# Patient Record
Sex: MALE | Race: WHITE | NOT HISPANIC OR LATINO | Employment: FULL TIME | URBAN - METROPOLITAN AREA
[De-identification: names, ages, dates, MRNs, and addresses within clinical notes are randomized per-mention and may not be internally consistent; named-entity substitution may affect disease eponyms.]

---

## 2022-10-22 ENCOUNTER — APPOINTMENT (OUTPATIENT)
Dept: CT IMAGING | Facility: HOSPITAL | Age: 62
End: 2022-10-22

## 2022-10-22 ENCOUNTER — HOSPITAL ENCOUNTER (EMERGENCY)
Facility: HOSPITAL | Age: 62
Discharge: ANOTHER HOSPITAL | End: 2022-10-22
Attending: EMERGENCY MEDICINE | Admitting: EMERGENCY MEDICINE

## 2022-10-22 VITALS
TEMPERATURE: 98.7 F | HEIGHT: 70 IN | DIASTOLIC BLOOD PRESSURE: 77 MMHG | HEART RATE: 67 BPM | BODY MASS INDEX: 27.92 KG/M2 | RESPIRATION RATE: 15 BRPM | WEIGHT: 195 LBS | SYSTOLIC BLOOD PRESSURE: 116 MMHG | OXYGEN SATURATION: 97 %

## 2022-10-22 DIAGNOSIS — S02.92XA CLOSED EXTENSIVE FACIAL FRACTURES, INITIAL ENCOUNTER: Primary | ICD-10-CM

## 2022-10-22 DIAGNOSIS — S16.1XXA STRAIN OF NECK MUSCLE, INITIAL ENCOUNTER: ICD-10-CM

## 2022-10-22 DIAGNOSIS — S09.90XA INJURY OF HEAD, INITIAL ENCOUNTER: ICD-10-CM

## 2022-10-22 PROCEDURE — 99284 EMERGENCY DEPT VISIT MOD MDM: CPT | Performed by: EMERGENCY MEDICINE

## 2022-10-22 PROCEDURE — 70486 CT MAXILLOFACIAL W/O DYE: CPT

## 2022-10-22 PROCEDURE — 99283 EMERGENCY DEPT VISIT LOW MDM: CPT

## 2022-10-22 PROCEDURE — 70450 CT HEAD/BRAIN W/O DYE: CPT

## 2022-10-22 PROCEDURE — 72125 CT NECK SPINE W/O DYE: CPT

## 2022-10-22 RX ORDER — ATORVASTATIN CALCIUM 80 MG/1
80 TABLET, FILM COATED ORAL DAILY
COMMUNITY

## 2022-10-22 RX ORDER — METOPROLOL TARTRATE 50 MG/1
50 TABLET, FILM COATED ORAL 2 TIMES DAILY
COMMUNITY

## 2022-10-22 RX ORDER — ONDANSETRON 4 MG/1
4 TABLET, ORALLY DISINTEGRATING ORAL ONCE
Status: DISCONTINUED | OUTPATIENT
Start: 2022-10-22 | End: 2022-10-22 | Stop reason: HOSPADM

## 2022-10-22 RX ORDER — DIAPER,BRIEF,INFANT-TODD,DISP
1 EACH MISCELLANEOUS ONCE
Status: DISCONTINUED | OUTPATIENT
Start: 2022-10-22 | End: 2022-10-22 | Stop reason: HOSPADM

## 2022-10-22 RX ORDER — LOSARTAN POTASSIUM AND HYDROCHLOROTHIAZIDE 12.5; 5 MG/1; MG/1
TABLET ORAL DAILY
COMMUNITY

## 2022-10-22 RX ORDER — OMEPRAZOLE 40 MG/1
40 CAPSULE, DELAYED RELEASE ORAL DAILY
COMMUNITY

## 2022-10-22 NOTE — PROGRESS NOTES
"SBAR:    S: Pt to ED/UC c/o mechanical fall tripping over shoes hitting right eye on fire place from standing position around 2330 last night. Unknown LOC. C/o dizziness and HA    B: PMH HTN, HCL. Denies AC. Drinks 4-6 beers 5 nights a week.     A: Pt alert and oriented x4. Speech is clear. Speaking in complete sentences. Large hematoma to right periorbital. Swelling impairing pt ability to open right eye completely. C/o 2/10 HA. C/o \"nasal congestion\". CT head shows multiple maxillofacial fractures. See CT read for entire report. +nausea, no active vomiting. Offered nausea meds, refused. No IV.     R: Transfer to [facility] via private vehicle. Verbal report given to TRACY Antonio. Accepting MD Dockery  "

## 2022-10-22 NOTE — ED PROVIDER NOTES
Subjective   History of Present Illness  Patient 62-year-old man who had fell asleep on the couch last night and got up in the middle the night and tripped and fell onto his head and face.  He has bruising to the left orbit and abrasion to the nasal bridge.  Patient does not recall the events and feels he did pass out.  He denies any vomiting or numbness or weakness.  His neck is sore this morning.  He denies any chest pain or shortness of breath or abdominal pain or upper or lower extremity injuries.  He has not on any blood thinners.  He believes his last tetanus immunization was within the past 5 years.        Review of Systems   Constitutional: Negative for fever.   HENT: Positive for nosebleeds. Negative for sore throat.    Respiratory: Negative for cough and shortness of breath.    Gastrointestinal: Negative for nausea and vomiting.   Genitourinary: Negative for difficulty urinating.   Musculoskeletal: Positive for neck pain and neck stiffness.   Skin: Positive for wound.   Neurological: Negative for weakness and numbness.   All other systems reviewed and are negative.      Past Medical History:   Diagnosis Date   • Hyperlipidemia    • Hypertension        No Known Allergies    History reviewed. No pertinent surgical history.    History reviewed. No pertinent family history.    Social History     Socioeconomic History   • Marital status: Single   Tobacco Use   • Smoking status: Never   • Smokeless tobacco: Current     Types: Chew   Substance and Sexual Activity   • Alcohol use: Yes     Alcohol/week: 4.0 standard drinks     Types: 4 Cans of beer per week     Comment: 5/7 days/wk   • Drug use: Never           Objective   Physical Exam  Vitals and nursing note reviewed.   Constitutional:       Appearance: Normal appearance.   HENT:      Head: Normocephalic.      Comments: Tenderness and ecchymosis to the superior orbit on the left side.  There is swelling to the eyelid.  There is no inferior orbital tenderness.   Patient has no evidence of entrapment.     Nose:      Comments: Diffuse nasal tenderness.  No gross deformity.  There is abrasion to the nasal bridge.  No septal hematoma noted.     Mouth/Throat:      Mouth: Mucous membranes are moist.      Pharynx: Oropharynx is clear.   Neck:      Comments: Paraspinal tenderness to the cervical spine without midline tenderness or step-off.  Cardiovascular:      Rate and Rhythm: Normal rate and regular rhythm.      Pulses: Normal pulses.      Heart sounds: Normal heart sounds.   Pulmonary:      Effort: Pulmonary effort is normal.      Breath sounds: Normal breath sounds.   Chest:      Chest wall: No tenderness.   Abdominal:      Palpations: Abdomen is soft.      Tenderness: There is no abdominal tenderness.   Musculoskeletal:         General: No tenderness. Normal range of motion.   Skin:     General: Skin is warm and dry.      Capillary Refill: Capillary refill takes less than 2 seconds.   Neurological:      General: No focal deficit present.      Mental Status: He is alert.      Sensory: No sensory deficit.      Motor: No weakness.         Procedures           ED Course           1858: Patient advised of plan.  Patient to go to [facility] emergency department for further evaluation of facial injuries.  Patient reevaluated.  Patient does have upward gaze of the left eye.  When using both eyes he does not complain of double vision.  Due to the swelling of the upper eyelid he does get limited eye vision when looking upwards.  Nasal wound was cleaned.  Antibiotic ointment to be applied.                                MDM   Patient with trip and fall last night when he fell asleep on the couch got up and tripped and fell striking his left side of the face and head.  He believes he had loss of consciousness.  He has ecchymosis of the left orbit.  No evidence of entrapment and no visual changes.  He does have abrasion to the nasal bridge.  Patient underwent CT of the head  and facial bones and neck.  He does have facial fractures.  A call was placed to ENT to discuss follow-up plans.    1823: Discussed with ENT, Dr. MARION Torres, states he does not treat facial fractures.  Will contact trauma center.    1848: d/w Dr. Seth Staples OMF.   Requests patient be sent to [facility] ED   Appreciate assistance with patients care    Discussed with ED physician Dr. Dubois, requests patient to be sent to the [facility] emergency department.  Patient has been accepted.  Appreciate assistance by consulting providers.      Final diagnoses:   Closed extensive facial fractures, initial encounter (HCC)   Injury of head, initial encounter   Strain of neck muscle, initial encounter       ED Disposition  ED Disposition     ED Disposition   Transfer to Another Facility     Condition   --    Comment   Patient to be transferred to [facility] emergency department for OMF evaluation.             No follow-up provider specified.       Medication List      No changes were made to your prescriptions during this visit.          Vinh Norman MD  10/22/22 1920

## 2022-10-22 NOTE — ED NOTES
Pt update on plan of care regarding awaiting call back from ENT. Pt stated understanding. VS updated. No apparent distress noted. Call light within reach

## 2025-04-19 ENCOUNTER — APPOINTMENT (OUTPATIENT)
Dept: CT IMAGING | Facility: HOSPITAL | Age: 65
DRG: 683 | End: 2025-04-19
Payer: MEDICARE

## 2025-04-19 ENCOUNTER — HOSPITAL ENCOUNTER (INPATIENT)
Facility: HOSPITAL | Age: 65
LOS: 2 days | Discharge: HOME | DRG: 683 | End: 2025-04-21
Attending: EMERGENCY MEDICINE | Admitting: INTERNAL MEDICINE
Payer: MEDICARE

## 2025-04-19 ENCOUNTER — APPOINTMENT (OUTPATIENT)
Dept: GENERAL RADIOLOGY | Facility: HOSPITAL | Age: 65
DRG: 683 | End: 2025-04-19
Payer: MEDICARE

## 2025-04-19 DIAGNOSIS — R00.0 TACHYCARDIA, UNSPECIFIED: ICD-10-CM

## 2025-04-19 DIAGNOSIS — R61 DIAPHORESIS: ICD-10-CM

## 2025-04-19 DIAGNOSIS — R53.1 GENERALIZED WEAKNESS: Primary | ICD-10-CM

## 2025-04-19 DIAGNOSIS — R00.0 SINUS TACHYCARDIA: ICD-10-CM

## 2025-04-19 DIAGNOSIS — R79.89 ELEVATED LACTIC ACID LEVEL: ICD-10-CM

## 2025-04-19 PROBLEM — I10 HTN (HYPERTENSION): Status: ACTIVE | Noted: 2025-04-19

## 2025-04-19 PROBLEM — F41.9 ANXIETY: Status: ACTIVE | Noted: 2025-04-19

## 2025-04-19 PROBLEM — E78.5 DYSLIPIDEMIA: Status: ACTIVE | Noted: 2025-04-19

## 2025-04-19 LAB
ALBUMIN SERPL-MCNC: 4.7 G/DL (ref 3.5–5.2)
ALBUMIN/GLOB SERPL: 1.7 G/DL
ALP SERPL-CCNC: 85 U/L (ref 39–117)
ALT SERPL W P-5'-P-CCNC: 28 U/L (ref 1–41)
ANION GAP SERPL CALCULATED.3IONS-SCNC: 16.5 MMOL/L (ref 5–15)
APTT PPP: 23.3 SECONDS (ref 22.7–35.4)
AST SERPL-CCNC: 21 U/L (ref 1–40)
BASOPHILS # BLD AUTO: 0.04 10*3/MM3 (ref 0–0.2)
BASOPHILS NFR BLD AUTO: 0.4 % (ref 0–1.5)
BILIRUB SERPL-MCNC: 0.7 MG/DL (ref 0–1.2)
BILIRUB UR QL STRIP: NEGATIVE
BUN SERPL-MCNC: 15 MG/DL (ref 8–23)
BUN/CREAT SERPL: 10.6 (ref 7–25)
CALCIUM SPEC-SCNC: 10.1 MG/DL (ref 8.6–10.5)
CHLORIDE SERPL-SCNC: 98 MMOL/L (ref 98–107)
CLARITY UR: CLEAR
CO2 SERPL-SCNC: 24.5 MMOL/L (ref 22–29)
COLOR UR: YELLOW
CREAT SERPL-MCNC: 1.42 MG/DL (ref 0.76–1.27)
CRP SERPL-MCNC: <0.3 MG/DL (ref 0–0.5)
D-LACTATE SERPL-SCNC: 1.3 MMOL/L (ref 0.5–2)
D-LACTATE SERPL-SCNC: 2.5 MMOL/L (ref 0.5–2)
DEPRECATED RDW RBC AUTO: 44.7 FL (ref 37–54)
EGFRCR SERPLBLD CKD-EPI 2021: 54.8 ML/MIN/1.73
EOSINOPHIL # BLD AUTO: 0.07 10*3/MM3 (ref 0–0.4)
EOSINOPHIL NFR BLD AUTO: 0.8 % (ref 0.3–6.2)
ERYTHROCYTE [DISTWIDTH] IN BLOOD BY AUTOMATED COUNT: 13 % (ref 12.3–15.4)
ERYTHROCYTE [SEDIMENTATION RATE] IN BLOOD: 11 MM/HR (ref 0–20)
GEN 5 1HR TROPONIN T REFLEX: 16 NG/L
GLOBULIN UR ELPH-MCNC: 2.8 GM/DL
GLUCOSE SERPL-MCNC: 140 MG/DL (ref 65–99)
GLUCOSE UR STRIP-MCNC: NEGATIVE MG/DL
HCT VFR BLD AUTO: 45.6 % (ref 37.5–51)
HGB BLD-MCNC: 16 G/DL (ref 13–17.7)
HGB UR QL STRIP.AUTO: NEGATIVE
IMM GRANULOCYTES # BLD AUTO: 0.02 10*3/MM3 (ref 0–0.05)
IMM GRANULOCYTES NFR BLD AUTO: 0.2 % (ref 0–0.5)
INR PPP: 1.04 (ref 0.9–1.1)
KETONES UR QL STRIP: NEGATIVE
LEUKOCYTE ESTERASE UR QL STRIP.AUTO: NEGATIVE
LYMPHOCYTES # BLD AUTO: 1.8 10*3/MM3 (ref 0.7–3.1)
LYMPHOCYTES NFR BLD AUTO: 19.9 % (ref 19.6–45.3)
MCH RBC QN AUTO: 33.1 PG (ref 26.6–33)
MCHC RBC AUTO-ENTMCNC: 35.1 G/DL (ref 31.5–35.7)
MCV RBC AUTO: 94.2 FL (ref 79–97)
MONOCYTES # BLD AUTO: 0.79 10*3/MM3 (ref 0.1–0.9)
MONOCYTES NFR BLD AUTO: 8.7 % (ref 5–12)
NEUTROPHILS NFR BLD AUTO: 6.31 10*3/MM3 (ref 1.7–7)
NEUTROPHILS NFR BLD AUTO: 70 % (ref 42.7–76)
NITRITE UR QL STRIP: NEGATIVE
NRBC BLD AUTO-RTO: 0 /100 WBC (ref 0–0.2)
PH UR STRIP.AUTO: 7 [PH] (ref 5–8)
PLATELET # BLD AUTO: 187 10*3/MM3 (ref 140–450)
PMV BLD AUTO: 10.3 FL (ref 6–12)
POTASSIUM SERPL-SCNC: 3.1 MMOL/L (ref 3.5–5.2)
PROCALCITONIN SERPL-MCNC: 0.08 NG/ML (ref 0–0.25)
PROT SERPL-MCNC: 7.5 G/DL (ref 6–8.5)
PROT UR QL STRIP: NEGATIVE
PROTHROMBIN TIME: 13.6 SECONDS (ref 11.7–14.2)
RBC # BLD AUTO: 4.84 10*6/MM3 (ref 4.14–5.8)
SODIUM SERPL-SCNC: 139 MMOL/L (ref 136–145)
SP GR UR STRIP: 1.01 (ref 1–1.03)
TROPONIN T NUMERIC DELTA: 1 NG/L
TROPONIN T SERPL HS-MCNC: 15 NG/L
UROBILINOGEN UR QL STRIP: NORMAL
WBC NRBC COR # BLD AUTO: 9.03 10*3/MM3 (ref 3.4–10.8)

## 2025-04-19 PROCEDURE — 84484 ASSAY OF TROPONIN QUANT: CPT | Performed by: EMERGENCY MEDICINE

## 2025-04-19 PROCEDURE — 93005 ELECTROCARDIOGRAM TRACING: CPT | Performed by: EMERGENCY MEDICINE

## 2025-04-19 PROCEDURE — 36415 COLL VENOUS BLD VENIPUNCTURE: CPT

## 2025-04-19 PROCEDURE — 85730 THROMBOPLASTIN TIME PARTIAL: CPT | Performed by: EMERGENCY MEDICINE

## 2025-04-19 PROCEDURE — 85652 RBC SED RATE AUTOMATED: CPT | Performed by: INTERNAL MEDICINE

## 2025-04-19 PROCEDURE — 80053 COMPREHEN METABOLIC PANEL: CPT | Performed by: EMERGENCY MEDICINE

## 2025-04-19 PROCEDURE — 84145 PROCALCITONIN (PCT): CPT | Performed by: EMERGENCY MEDICINE

## 2025-04-19 PROCEDURE — 85610 PROTHROMBIN TIME: CPT | Performed by: EMERGENCY MEDICINE

## 2025-04-19 PROCEDURE — 85025 COMPLETE CBC W/AUTO DIFF WBC: CPT | Performed by: EMERGENCY MEDICINE

## 2025-04-19 PROCEDURE — 70450 CT HEAD/BRAIN W/O DYE: CPT

## 2025-04-19 PROCEDURE — 25810000003 SODIUM CHLORIDE 0.9 % SOLUTION: Performed by: INTERNAL MEDICINE

## 2025-04-19 PROCEDURE — 99285 EMERGENCY DEPT VISIT HI MDM: CPT

## 2025-04-19 PROCEDURE — 83605 ASSAY OF LACTIC ACID: CPT | Performed by: EMERGENCY MEDICINE

## 2025-04-19 PROCEDURE — 25810000003 SODIUM CHLORIDE 0.9 % SOLUTION: Performed by: EMERGENCY MEDICINE

## 2025-04-19 PROCEDURE — 93005 ELECTROCARDIOGRAM TRACING: CPT

## 2025-04-19 PROCEDURE — 93010 ELECTROCARDIOGRAM REPORT: CPT | Performed by: INTERNAL MEDICINE

## 2025-04-19 PROCEDURE — 81003 URINALYSIS AUTO W/O SCOPE: CPT | Performed by: EMERGENCY MEDICINE

## 2025-04-19 PROCEDURE — 71045 X-RAY EXAM CHEST 1 VIEW: CPT

## 2025-04-19 PROCEDURE — 86140 C-REACTIVE PROTEIN: CPT | Performed by: EMERGENCY MEDICINE

## 2025-04-19 RX ORDER — PANTOPRAZOLE SODIUM 40 MG/1
40 TABLET, DELAYED RELEASE ORAL
Status: DISCONTINUED | OUTPATIENT
Start: 2025-04-20 | End: 2025-04-21 | Stop reason: HOSPADM

## 2025-04-19 RX ORDER — ONDANSETRON 2 MG/ML
4 INJECTION INTRAMUSCULAR; INTRAVENOUS EVERY 6 HOURS PRN
Status: DISCONTINUED | OUTPATIENT
Start: 2025-04-19 | End: 2025-04-21 | Stop reason: HOSPADM

## 2025-04-19 RX ORDER — SODIUM CHLORIDE 9 MG/ML
40 INJECTION, SOLUTION INTRAVENOUS AS NEEDED
Status: DISCONTINUED | OUTPATIENT
Start: 2025-04-19 | End: 2025-04-21 | Stop reason: HOSPADM

## 2025-04-19 RX ORDER — METOPROLOL TARTRATE 50 MG
50 TABLET ORAL 2 TIMES DAILY
Status: DISCONTINUED | OUTPATIENT
Start: 2025-04-19 | End: 2025-04-21 | Stop reason: HOSPADM

## 2025-04-19 RX ORDER — LOSARTAN POTASSIUM 50 MG/1
50 TABLET ORAL
Status: DISCONTINUED | OUTPATIENT
Start: 2025-04-20 | End: 2025-04-21 | Stop reason: HOSPADM

## 2025-04-19 RX ORDER — SODIUM CHLORIDE 0.9 % (FLUSH) 0.9 %
10 SYRINGE (ML) INJECTION EVERY 12 HOURS SCHEDULED
Status: DISCONTINUED | OUTPATIENT
Start: 2025-04-19 | End: 2025-04-21 | Stop reason: HOSPADM

## 2025-04-19 RX ORDER — ATORVASTATIN CALCIUM 80 MG/1
80 TABLET, FILM COATED ORAL DAILY
Status: DISCONTINUED | OUTPATIENT
Start: 2025-04-20 | End: 2025-04-21 | Stop reason: HOSPADM

## 2025-04-19 RX ORDER — SODIUM CHLORIDE 0.9 % (FLUSH) 0.9 %
10 SYRINGE (ML) INJECTION AS NEEDED
Status: DISCONTINUED | OUTPATIENT
Start: 2025-04-19 | End: 2025-04-21 | Stop reason: HOSPADM

## 2025-04-19 RX ORDER — ONDANSETRON 4 MG/1
4 TABLET, ORALLY DISINTEGRATING ORAL EVERY 6 HOURS PRN
Status: DISCONTINUED | OUTPATIENT
Start: 2025-04-19 | End: 2025-04-21 | Stop reason: HOSPADM

## 2025-04-19 RX ORDER — POTASSIUM CHLORIDE 1500 MG/1
40 TABLET, EXTENDED RELEASE ORAL EVERY 4 HOURS
Status: COMPLETED | OUTPATIENT
Start: 2025-04-19 | End: 2025-04-20

## 2025-04-19 RX ORDER — SODIUM CHLORIDE 9 MG/ML
125 INJECTION, SOLUTION INTRAVENOUS CONTINUOUS
Status: ACTIVE | OUTPATIENT
Start: 2025-04-19 | End: 2025-04-20

## 2025-04-19 RX ORDER — NITROGLYCERIN 0.4 MG/1
0.4 TABLET SUBLINGUAL
Status: DISCONTINUED | OUTPATIENT
Start: 2025-04-19 | End: 2025-04-21 | Stop reason: HOSPADM

## 2025-04-19 RX ORDER — ACETAMINOPHEN 650 MG/1
650 SUPPOSITORY RECTAL EVERY 4 HOURS PRN
Status: DISCONTINUED | OUTPATIENT
Start: 2025-04-19 | End: 2025-04-21 | Stop reason: HOSPADM

## 2025-04-19 RX ORDER — ACETAMINOPHEN 160 MG/5ML
650 SOLUTION ORAL EVERY 4 HOURS PRN
Status: DISCONTINUED | OUTPATIENT
Start: 2025-04-19 | End: 2025-04-21 | Stop reason: HOSPADM

## 2025-04-19 RX ORDER — HYDROCHLOROTHIAZIDE 12.5 MG/1
12.5 TABLET ORAL
Status: DISCONTINUED | OUTPATIENT
Start: 2025-04-20 | End: 2025-04-20

## 2025-04-19 RX ORDER — ACETAMINOPHEN 325 MG/1
650 TABLET ORAL EVERY 4 HOURS PRN
Status: DISCONTINUED | OUTPATIENT
Start: 2025-04-19 | End: 2025-04-21 | Stop reason: HOSPADM

## 2025-04-19 RX ADMIN — SODIUM CHLORIDE 125 ML/HR: 9 INJECTION, SOLUTION INTRAVENOUS at 22:33

## 2025-04-19 RX ADMIN — POTASSIUM CHLORIDE 40 MEQ: 1500 TABLET, EXTENDED RELEASE ORAL at 20:42

## 2025-04-19 RX ADMIN — Medication 10 ML: at 22:38

## 2025-04-19 RX ADMIN — METOPROLOL TARTRATE 50 MG: 50 TABLET, FILM COATED ORAL at 22:36

## 2025-04-19 RX ADMIN — SODIUM CHLORIDE 1000 ML: 9 INJECTION, SOLUTION INTRAVENOUS at 16:39

## 2025-04-19 NOTE — ED PROVIDER NOTES
EMERGENCY DEPARTMENT ENCOUNTER    Room Number:  25/25  PCP: Antonietta Romero APRN  Historian: Patient/family      HPI:  Chief Complaint: Weakness  A complete HPI/ROS/PMH/PSH/SH/FH are unobtainable due to: None  Context: Bud Camacho is a 64 y.o. male who presents to the ED c/o sudden onset of episodic generalized weakness that is now been present over the last 1 to 2 weeks.  He reports the episodes can occur without warning and are typically associated with nausea, diaphoresis, as well as lightheadedness.  He also reports some tremors in his upper extremities bilaterally occasionally.  He currently denies headache, chest pain, back pain, fever/chills, dysuria/hematuria, or known sick contacts.  He denies previous episodes of similar symptoms and states that the symptoms are debilitating and quite severe in intensity.            PAST MEDICAL HISTORY  Active Ambulatory Problems     Diagnosis Date Noted    No Active Ambulatory Problems     Resolved Ambulatory Problems     Diagnosis Date Noted    No Resolved Ambulatory Problems     Past Medical History:   Diagnosis Date    Hyperlipidemia     Hypertension          PAST SURGICAL HISTORY  History reviewed. No pertinent surgical history.      FAMILY HISTORY  History reviewed. No pertinent family history.      SOCIAL HISTORY  Social History     Socioeconomic History    Marital status:    Tobacco Use    Smoking status: Never    Smokeless tobacco: Current     Types: Chew   Substance and Sexual Activity    Alcohol use: Yes     Alcohol/week: 4.0 standard drinks of alcohol     Types: 4 Cans of beer per week     Comment: 5/7 days/wk    Drug use: Never         ALLERGIES  Amlodipine, Lisinopril, and Olmesartan        REVIEW OF SYSTEMS  Review of Systems   Constitutional:  Positive for diaphoresis. Negative for activity change, appetite change and fever.   HENT:  Negative for congestion and sore throat.    Eyes: Negative.    Respiratory:  Negative for cough and shortness  of breath.    Cardiovascular:  Negative for chest pain and leg swelling.   Gastrointestinal:  Positive for nausea. Negative for abdominal pain, diarrhea and vomiting.   Endocrine: Negative.    Genitourinary:  Negative for decreased urine volume and dysuria.   Musculoskeletal:  Negative for neck pain.   Skin:  Negative for rash and wound.   Allergic/Immunologic: Negative.    Neurological:  Positive for tremors, weakness and light-headedness. Negative for numbness and headaches.   Hematological: Negative.    Psychiatric/Behavioral: Negative.     All other systems reviewed and are negative.         PHYSICAL EXAM  ED Triage Vitals   Temp Heart Rate Resp BP SpO2   04/19/25 1525 04/19/25 1525 04/19/25 1525 04/19/25 1526 04/19/25 1525   98.8 °F (37.1 °C) (!) 147 18 140/93 97 %      Temp src Heart Rate Source Patient Position BP Location FiO2 (%)   -- 04/19/25 1525 04/19/25 1526 04/19/25 1526 --    Monitor Sitting Right arm        Physical Exam  Constitutional:       General: He is not in acute distress.     Appearance: Normal appearance. He is not ill-appearing or toxic-appearing.   HENT:      Head: Normocephalic and atraumatic.   Eyes:      Extraocular Movements: Extraocular movements intact.      Pupils: Pupils are equal, round, and reactive to light.   Cardiovascular:      Rate and Rhythm: Regular rhythm. Tachycardia present.      Heart sounds: No murmur heard.     No friction rub. No gallop.   Pulmonary:      Effort: Pulmonary effort is normal.      Breath sounds: Normal breath sounds.   Abdominal:      General: Abdomen is flat. There is no distension.      Palpations: Abdomen is soft.      Tenderness: There is no abdominal tenderness.   Musculoskeletal:         General: No swelling or tenderness. Normal range of motion.      Cervical back: Normal range of motion and neck supple.   Skin:     General: Skin is warm and dry.   Neurological:      General: No focal deficit present.      Mental Status: He is alert and  oriented to person, place, and time.      Sensory: No sensory deficit.      Motor: No weakness.   Psychiatric:         Mood and Affect: Mood normal.         Behavior: Behavior normal.           Vital signs and nursing notes reviewed.          LAB RESULTS  Recent Results (from the past 24 hours)   ECG 12 Lead Tachycardia    Collection Time: 04/19/25  3:29 PM   Result Value Ref Range    QT Interval 316 ms    QTC Interval 462 ms   Comprehensive Metabolic Panel    Collection Time: 04/19/25  3:41 PM    Specimen: Blood   Result Value Ref Range    Glucose 140 (H) 65 - 99 mg/dL    BUN 15 8 - 23 mg/dL    Creatinine 1.42 (H) 0.76 - 1.27 mg/dL    Sodium 139 136 - 145 mmol/L    Potassium 3.1 (L) 3.5 - 5.2 mmol/L    Chloride 98 98 - 107 mmol/L    CO2 24.5 22.0 - 29.0 mmol/L    Calcium 10.1 8.6 - 10.5 mg/dL    Total Protein 7.5 6.0 - 8.5 g/dL    Albumin 4.7 3.5 - 5.2 g/dL    ALT (SGPT) 28 1 - 41 U/L    AST (SGOT) 21 1 - 40 U/L    Alkaline Phosphatase 85 39 - 117 U/L    Total Bilirubin 0.7 0.0 - 1.2 mg/dL    Globulin 2.8 gm/dL    A/G Ratio 1.7 g/dL    BUN/Creatinine Ratio 10.6 7.0 - 25.0    Anion Gap 16.5 (H) 5.0 - 15.0 mmol/L    eGFR 54.8 (L) >60.0 mL/min/1.73   CBC Auto Differential    Collection Time: 04/19/25  3:41 PM    Specimen: Blood   Result Value Ref Range    WBC 9.03 3.40 - 10.80 10*3/mm3    RBC 4.84 4.14 - 5.80 10*6/mm3    Hemoglobin 16.0 13.0 - 17.7 g/dL    Hematocrit 45.6 37.5 - 51.0 %    MCV 94.2 79.0 - 97.0 fL    MCH 33.1 (H) 26.6 - 33.0 pg    MCHC 35.1 31.5 - 35.7 g/dL    RDW 13.0 12.3 - 15.4 %    RDW-SD 44.7 37.0 - 54.0 fl    MPV 10.3 6.0 - 12.0 fL    Platelets 187 140 - 450 10*3/mm3    Neutrophil % 70.0 42.7 - 76.0 %    Lymphocyte % 19.9 19.6 - 45.3 %    Monocyte % 8.7 5.0 - 12.0 %    Eosinophil % 0.8 0.3 - 6.2 %    Basophil % 0.4 0.0 - 1.5 %    Immature Grans % 0.2 0.0 - 0.5 %    Neutrophils, Absolute 6.31 1.70 - 7.00 10*3/mm3    Lymphocytes, Absolute 1.80 0.70 - 3.10 10*3/mm3    Monocytes, Absolute 0.79 0.10 -  0.90 10*3/mm3    Eosinophils, Absolute 0.07 0.00 - 0.40 10*3/mm3    Basophils, Absolute 0.04 0.00 - 0.20 10*3/mm3    Immature Grans, Absolute 0.02 0.00 - 0.05 10*3/mm3    nRBC 0.0 0.0 - 0.2 /100 WBC   High Sensitivity Troponin T    Collection Time: 04/19/25  3:41 PM    Specimen: Blood   Result Value Ref Range    HS Troponin T 15 <22 ng/L   Procalcitonin    Collection Time: 04/19/25  3:41 PM    Specimen: Blood   Result Value Ref Range    Procalcitonin 0.08 0.00 - 0.25 ng/mL   Protime-INR    Collection Time: 04/19/25  4:04 PM    Specimen: Blood   Result Value Ref Range    Protime 13.6 11.7 - 14.2 Seconds    INR 1.04 0.90 - 1.10   aPTT    Collection Time: 04/19/25  4:04 PM    Specimen: Blood   Result Value Ref Range    PTT 23.3 22.7 - 35.4 seconds   Urinalysis With Microscopic If Indicated (No Culture) - Urine, Clean Catch    Collection Time: 04/19/25  4:04 PM    Specimen: Urine, Clean Catch   Result Value Ref Range    Color, UA Yellow Yellow, Straw    Appearance, UA Clear Clear    pH, UA 7.0 5.0 - 8.0    Specific Gravity, UA 1.014 1.005 - 1.030    Glucose, UA Negative Negative    Ketones, UA Negative Negative    Bilirubin, UA Negative Negative    Blood, UA Negative Negative    Protein, UA Negative Negative    Leuk Esterase, UA Negative Negative    Nitrite, UA Negative Negative    Urobilinogen, UA 1.0 E.U./dL 0.2 - 1.0 E.U./dL   Lactic Acid, Plasma    Collection Time: 04/19/25  4:04 PM    Specimen: Blood   Result Value Ref Range    Lactate 2.5 (C) 0.5 - 2.0 mmol/L   High Sensitivity Troponin T 1Hr    Collection Time: 04/19/25  4:39 PM    Specimen: Blood   Result Value Ref Range    HS Troponin T 16 <22 ng/L    Troponin T Numeric Delta 1 Abnormal if >/=3 ng/L       Ordered the above labs and reviewed the results.        RADIOLOGY  CT Head Without Contrast  Result Date: 4/19/2025  CT HEAD WO CONTRAST-  INDICATIONS: Weakness  TECHNIQUE: Radiation dose reduction techniques were utilized, including automated exposure control  and exposure modulation based on body size. Noncontrast head CT  COMPARISON: 10/22/2022  FINDINGS:    No acute intracranial hemorrhage, midline shift or mass effect. No acute territorial infarct is identified.  Arterial calcifications are seen at the base of the brain.  Ventricles, cisterns, cerebral sulci are unremarkable for patient age.  Small likely mucous retention cyst or polyp in left maxillary sinus. The visualized paranasal sinuses, orbits, mastoid air cells are unremarkable.        No acute intracranial hemorrhage or hydrocephalus. If there is further clinical concern, MRI could be considered for further evaluation.  This report was finalized on 4/19/2025 5:20 PM by Dr. Yoav Parra M.D on Workstation: [employer]      XR Chest 1 View  Result Date: 4/19/2025  XR CHEST 1 VW-  HISTORY: Male who is 64 years-old, weakness  TECHNIQUE: Frontal view of the chest  COMPARISON: None available  FINDINGS: The heart size is normal. Aorta is tortuous. Pulmonary vasculature is unremarkable. No focal pulmonary consolidation, pleural effusion, or pneumothorax. No acute osseous process.      No focal pulmonary consolidation. Tortuous aorta. Follow-up as clinically indicated.  This report was finalized on 4/19/2025 4:28 PM by Dr. Yoav Parra M.D on Workstation: [employer]        Ordered the above noted radiological studies. Reviewed by me in PACS.            PROCEDURES  Procedures    EKG independently interpreted by myself as follows:    EKG          EKG time: 1529  Rhythm/Rate: sinus tachycardia, 128  P waves and AR: nml  QRS, axis: nml, nml   ST and T waves: nml     Interpreted Contemporaneously by me, independently viewed  No previous EKG available for comparison            MEDICATIONS GIVEN IN ER  Medications   sodium chloride 0.9 % flush 10 mL (has no administration in time range)   sodium chloride 0.9 % bolus 1,000 mL (1,000 mL Intravenous New Bag 4/19/25 7667)                   MEDICAL DECISION MAKING, PROGRESS,  and CONSULTS    All labs have been independently reviewed by me.  All radiology studies have been reviewed by me and I have also reviewed the radiology report.   EKG's independently viewed and interpreted by me.  Discussion below represents my analysis of pertinent findings related to patient's condition, differential diagnosis, treatment plan and final disposition.      Additional sources:  - Discussed/ obtained information from independent historians: History obtained from the patient as well as the patient's family at bedside.    - External (non-ED) record review: Upon medical records review, the patient was last seen and evaluated in the outpatient office at [facility] on 1/17/2025 in follow-up and evaluation for his known hypertension and hyperlipidemia.    - Chronic or social conditions impacting care: None reported    - Shared decision making: Admission decision based on shared conversations have between myself as well as the patient and patient's family at bedside.                   Orders placed during this visit:  Orders Placed This Encounter   Procedures    XR Chest 1 View    CT Head Without Contrast    Comprehensive Metabolic Panel    CBC Auto Differential    Protime-INR    aPTT    Urinalysis With Microscopic If Indicated (No Culture) - Urine, Clean Catch    High Sensitivity Troponin T    Lactic Acid, Plasma    Procalcitonin    High Sensitivity Troponin T 1Hr    STAT Lactic Acid, Reflex    Orthostatic Vitals    LHA (on-call MD unless specified) Details    ECG 12 Lead Tachycardia    ECG 12 Lead Rhythm Change    Insert Peripheral IV    Inpatient Admission    CBC & Differential               Differential diagnosis includes but is not limited to:    Sepsis, acute bacteremia, acute coronary syndrome, cardiac rhythm disturbance, dehydration, acute renal insufficiency, urinary tract infection, pneumonia, or electrolyte disturbance      Independent interpretation of labs, radiology studies, and discussions  with consultants:    Chest x-ray independently interpreted by myself with my interpretation showing no cardiomegaly nor edema, infiltrate, or pneumothorax.      ED Course as of 04/19/25 1842   Sat Apr 19, 2025   1819 On reevaluation, the patient is resting comfortably with stable vital signs.  His heart rate is greatly improved and is currently 96 following a liter of normal saline.  I informed him that his workup thus far is fairly unremarkable however I would like to admit him to the hospital today given the significant physical changes this has had on him since it began recently without warning.  Certainly think he warrants further assessment.  He and his family agree with that plan and all questions answered. [BM]   1841 The patient's presentation, workup, as well as diagnosis and treatment plan was discussed at length with Dr. Adams of [facility].  He agrees to admit the patient to the hospital today for further management and treatment. [BM]      ED Course User Index  [BM] Parmjit Fan MD           DIAGNOSIS  Final diagnoses:   Generalized weakness   Sinus tachycardia   Diaphoresis   Elevated lactic acid level         DISPOSITION  ADMISSION    Discussed treatment plan and reason for admission with pt/family and admitting physician.  Pt/family voiced understanding of the plan for admission for further testing/treatment as needed.               Latest Documented Vital Signs:  As of 18:42 EDT  BP- 146/94 HR- 96 Temp- 98.8 °F (37.1 °C) O2 sat- 100%              --    Please note that portions of this were completed with a voice recognition program.       Note Disclaimer: At [facility], we believe that sharing information builds trust and better relationships. You are receiving this note because you are receiving care at [facility] or recently visited. It is possible you will see health information before a provider has talked with you about it. This kind of information can be easy to misunderstand. To  help you fully understand what it means for your health, we urge you to discuss this note with your provider.             Parmjit Fan MD  04/19/25 0799

## 2025-04-19 NOTE — ED NOTES
Nursing report ED to floor  Bud Camacho  64 y.o.  male    HPI :  HPI  Stated Reason for Visit: fatigue x 1.5 weeks, near syncopal episodes (sweating, nausea, extreme fatigue)  History Obtained From: patient  Duration (Days): 9    Chief Complaint  Chief Complaint   Patient presents with    Fatigue          Weakness - Generalized       Admitting doctor:   Rebekah Adams MD    Admitting diagnosis:   The primary encounter diagnosis was Generalized weakness. Diagnoses of Sinus tachycardia, Diaphoresis, and Elevated lactic acid level were also pertinent to this visit.    Code status:   Current Code Status       Date Active Code Status Order ID Comments User Context       Not on file            Allergies:   Amlodipine, Lisinopril, and Olmesartan    Isolation:   No active isolations    Intake and Output    Intake/Output Summary (Last 24 hours) at 4/19/2025 1848  Last data filed at 4/19/2025 1847  Gross per 24 hour   Intake 1000 ml   Output --   Net 1000 ml       Weight:       04/19/25  1548   Weight: 88.5 kg (195 lb 1.7 oz)       Most recent vitals:   Vitals:    04/19/25 1601 04/19/25 1636 04/19/25 1704 04/19/25 1731   BP: 139/98  139/99 146/94   BP Location:    Right arm   Patient Position:    Sitting   Pulse:  117 101 96   Resp:    17   Temp:       SpO2:  96% 93% 100%   Weight:       Height:           Active LDAs/IV Access:   Lines, Drains & Airways       Active LDAs       Name Placement date Placement time Site Days    Peripheral IV 04/19/25 1549 20 G Left Antecubital 04/19/25  1549  Antecubital  less than 1                    Labs (abnormal labs have a star):   Labs Reviewed   COMPREHENSIVE METABOLIC PANEL - Abnormal; Notable for the following components:       Result Value    Glucose 140 (*)     Creatinine 1.42 (*)     Potassium 3.1 (*)     Anion Gap 16.5 (*)     eGFR 54.8 (*)     All other components within normal limits    Narrative:     GFR Categories in Chronic Kidney Disease (CKD)      GFR Category          GFR  (mL/min/1.73)    Interpretation  G1                     90 or greater         Normal or high (1)  G2                      60-89                Mild decrease (1)  G3a                   45-59                Mild to moderate decrease  G3b                   30-44                Moderate to severe decrease  G4                    15-29                Severe decrease  G5                    14 or less           Kidney failure          (1)In the absence of evidence of kidney disease, neither GFR category G1 or G2 fulfill the criteria for CKD.    eGFR calculation 2021 CKD-EPI creatinine equation, which does not include race as a factor   CBC WITH AUTO DIFFERENTIAL - Abnormal; Notable for the following components:    MCH 33.1 (*)     All other components within normal limits   LACTIC ACID, PLASMA - Abnormal; Notable for the following components:    Lactate 2.5 (*)     All other components within normal limits   PROTIME-INR - Normal   APTT - Normal   URINALYSIS W/ MICROSCOPIC IF INDICATED (NO CULTURE) - Normal    Narrative:     Urine microscopic not indicated.   TROPONIN - Normal    Narrative:     High Sensitive Troponin T Reference Range:  <14.0 ng/L- Negative Female for AMI  <22.0 ng/L- Negative Male for AMI  >=14 - Abnormal Female indicating possible myocardial injury.  >=22 - Abnormal Male indicating possible myocardial injury.   Clinicians would have to utilize clinical acumen, EKG, Troponin, and serial changes to determine if it is an Acute Myocardial Infarction or myocardial injury due to an underlying chronic condition.        PROCALCITONIN - Normal    Narrative:     As a Marker for Sepsis (Non-Neonates):    1. <0.5 ng/mL represents a low risk of severe sepsis and/or septic shock.  2. >2 ng/mL represents a high risk of severe sepsis and/or septic shock.    As a Marker for Lower Respiratory Tract Infections that require antibiotic therapy:    PCT on Admission    Antibiotic Therapy       6-12 Hrs later    >0.5              "   Strongly Recommended  >0.25 - <0.5        Recommended   0.1 - 0.25          Discouraged              Remeasure/reassess PCT  <0.1                Strongly Discouraged     Remeasure/reassess PCT    As 28 day mortality risk marker: \"Change in Procalcitonin Result\" (>80% or <=80%) if Day 0 (or Day 1) and Day 4 values are available. Refer to http://[email or web address]STR[facility]ct-[email or web address].com    Change in PCT <=80%  A decrease of PCT levels below or equal to 80% defines a positive change in PCT test result representing a higher risk for 28-day all-cause mortality of patients diagnosed with severe sepsis for septic shock.    Change in PCT >80%  A decrease of PCT levels of more than 80% defines a negative change in PCT result representing a lower risk for 28-day all-cause mortality of patients diagnosed with severe sepsis or septic shock.      HIGH SENSITIVITIY TROPONIN T 1HR - Normal    Narrative:     High Sensitive Troponin T Reference Range:  <14.0 ng/L- Negative Female for AMI  <22.0 ng/L- Negative Male for AMI  >=14 - Abnormal Female indicating possible myocardial injury.  >=22 - Abnormal Male indicating possible myocardial injury.   Clinicians would have to utilize clinical acumen, EKG, Troponin, and serial changes to determine if it is an Acute Myocardial Infarction or myocardial injury due to an underlying chronic condition.        LACTIC ACID, REFLEX   SEDIMENTATION RATE   C-REACTIVE PROTEIN   CBC AND DIFFERENTIAL    Narrative:     The following orders were created for panel order CBC & Differential.  Procedure                               Abnormality         Status                     ---------                               -----------         ------                     CBC Auto Differential[[id number]]        Abnormal            Final result                 Please view results for these tests on the individual orders.       EKG:   ECG 12 Lead Tachycardia   Preliminary Result   HEART LSGV=052  bpm   RR Maplpwrx=713  ms   NJ " Flxseqil=490  ms   P Horizontal Axis=-12  deg   P Front Axis=33  deg   QRSD Interval=96  ms   QT [place]=265  ms   JKyZ=131  ms   QRS Axis=16  deg   T Wave Axis=-7  deg   - ABNORMAL ECG -   Sinus tachycardia   Abnormal R-wave progression, late transition   Inferior infarct, age indeterminate   Date and Time of Study:2025-04-19 15:29:51      ECG 12 Lead Rhythm Change    (Results Pending)       Meds given in ED:   Medications   sodium chloride 0.9 % flush 10 mL (has no administration in time range)   sodium chloride 0.9 % bolus 1,000 mL (0 mL Intravenous Stopped 4/19/25 7623)       Imaging results:  CT Head Without Contrast  Result Date: 4/19/2025   No acute intracranial hemorrhage or hydrocephalus. If there is further clinical concern, MRI could be considered for further evaluation.  This report was finalized on 4/19/2025 5:20 PM by Dr. Yoav Parra M.D on Workstation: [employer]      XR Chest 1 View  Result Date: 4/19/2025  No focal pulmonary consolidation. Tortuous aorta. Follow-up as clinically indicated.  This report was finalized on 4/19/2025 4:28 PM by Dr. Yaov Parra M.D on Workstation: [employer]        Ambulatory status:   - assist    Social issues:   Social History     Socioeconomic History    Marital status:    Tobacco Use    Smoking status: Never    Smokeless tobacco: Current     Types: Chew   Substance and Sexual Activity    Alcohol use: Yes     Alcohol/week: 4.0 standard drinks of alcohol     Types: 4 Cans of beer per week     Comment: 5/7 days/wk    Drug use: Never       Peripheral Neurovascular  Peripheral Neurovascular (Adult)  Peripheral Neurovascular WDL: WDL    Neuro Cognitive  Neuro Cognitive (Adult)  Cognitive/Neuro/Behavioral WDL: WDL    Learning  Learning Assessment  Learning Readiness and Ability: no barriers identified    Respiratory  Respiratory WDL  Respiratory WDL: WDL    Abdominal Pain       Pain Assessments  Pain (Adult)  (0-10) Pain Rating: Rest: 0  (0-10) Pain Rating:  Activity: 0    NIH Stroke Scale       Lupis Horn RN  04/19/25 18:48 EDT

## 2025-04-19 NOTE — CONSULTS
Internal medicine history and physical  INTERNAL MEDICINE   [facility]       Patient Identification:  Name: Bud Camacho  Age: 64 y.o.  Sex: male  :  1960  MRN: [id number]                   Primary Care Physician: Antonietta Romero APRN                               Date of admission:2025    Chief Complaint: Generalized weakness sweating and dizziness while standing and working and resolved upon laying and resting.  This has been going on for the last 5 weeks.    History of Present Illness:   Patient is a 64-year-old man with past medical history remarkable for hypertension, anxiety, dyslipidemia who chews tobacco and drink about 4 cans of beer per week is a retired  but currently works few hours a week as a  at a local business was in his usual state of his health until 5 -6 weeks ago when he started noticing episodes of sweating tremors nausea lightheadedness/feeling with most of the time this happens when he is up and standing.  He has episodes that can occur at any time and only constant that he finds is that he is either sitting up or standing up.  During those episodes he becomes tremulous and anxious.  He denies taking any new medications but currently has modified his diet to eat only fruit as he was trying to lose weight.  He is compliant with his medications.  He feels very weak during this episode.  Because the symptoms are happening very frequently whenever he is up and active and affecting his function decided to come to the emergency room for further evaluation.  Evaluation in the emergency room revealed significant tachycardia with heart rate of 147 blood pressure 140/93 and oxygen saturation 97%.  CT scan of the head did not show any acute intracranial process chest x-ray did not show any acute process and EKG shows sinus tachycardia.  He was noted to have initial lactic acid level of 2.5 and creatinine of 1.42 and unremarkable sed rate  and procalcitonin level.  Patient is being admitted for workup of episodic restlessness, nausea sweating and flushed feeling.  Patient denies taking any new medications or using any diet supplements.      Past Medical History:  Past Medical History:   Diagnosis Date    Hyperlipidemia     Hypertension      Past Surgical History:  History reviewed. No pertinent surgical history.   Home Meds:  (Not in a hospital admission)    Current Meds:     Current Facility-Administered Medications:     [COMPLETED] Insert Peripheral IV, , , Once **AND** sodium chloride 0.9 % flush 10 mL, 10 mL, Intravenous, PRN, Parmjit Fan MD    Current Outpatient Medications:     atorvastatin (LIPITOR) 80 MG tablet, Take 1 tablet by mouth Daily., Disp: , Rfl:     losartan-hydrochlorothiazide (HYZAAR) 50-12.5 MG per tablet, Take  by mouth Daily., Disp: , Rfl:     metoprolol tartrate (LOPRESSOR) 50 MG tablet, Take 1 tablet by mouth 2 (Two) Times a Day., Disp: , Rfl:     omeprazole (priLOSEC) 40 MG capsule, Take 1 capsule by mouth Daily., Disp: , Rfl:     sertraline (ZOLOFT) 50 MG tablet, Take 1 tablet by mouth Daily., Disp: , Rfl:   Allergies:  Allergies   Allergen Reactions    Amlodipine Swelling    Lisinopril Other (See Comments)     drowsy    Olmesartan Other (See Comments)     Sexual dysfunction     Social History:   Social History     Tobacco Use    Smoking status: Never    Smokeless tobacco: Current     Types: Chew   Substance Use Topics    Alcohol use: Yes     Alcohol/week: 4.0 standard drinks of alcohol     Types: 4 Cans of beer per week     Comment: 5/7 days/wk      Family History:  History reviewed. No pertinent family history.       Review of Systems  See history of present illness and past medical history.    Constitutional:  Positive for diaphoresis. Negative for activity change, appetite change and fever.   HENT:  Negative for congestion and sore throat.    Eyes: Negative.    Respiratory:  Negative for cough and shortness of  "breath.    Cardiovascular:  Negative for chest pain and leg swelling.   Gastrointestinal:  Positive for nausea. Negative for abdominal pain, diarrhea and vomiting.   Endocrine: Negative.    Genitourinary:  Negative for decreased urine volume and dysuria.   Musculoskeletal:  Negative for neck pain.   Skin:  Negative for rash and wound.   Allergic/Immunologic: Negative.    Neurological:  Positive for tremors, weakness and light-headedness. Negative for numbness and headaches.   Hematological: Negative.    Psychiatric/Behavioral: Negative.     All other systems reviewed and are negative.    Vitals:   /94 (BP Location: Right arm, Patient Position: Sitting)   Pulse 96   Temp 98.8 °F (37.1 °C)   Resp 17   Ht 177.8 cm (70\")   Wt 88.5 kg (195 lb 1.7 oz)   SpO2 100%   BMI 27.99 kg/m²   I/O:   Intake/Output Summary (Last 24 hours) at 4/19/2025 1903  Last data filed at 4/19/2025 1847  Gross per 24 hour   Intake 1000 ml   Output --   Net 1000 ml     Exam:  Patient is examined using the personal protective equipment as per guidelines from infection control for this particular patient as enacted.  Hand washing was performed before and after patient interaction.  General Appearance:    Alert, cooperative, no distress, appears stated age   Head:    Normocephalic, without obvious abnormality, atraumatic   Eyes:    PERRL, conjunctiva/corneas clear, EOM's intact, both eyes   Ears:    Normal external ear canals, both ears   Nose:   Nares normal, septum midline, mucosa normal, no drainage    or sinus tenderness   Throat:   Lips, tongue, gums normal; oral mucosa pink and moist   Neck:   Supple, symmetrical, trachea midline, no adenopathy;     thyroid:  no enlargement/tenderness/nodules; no carotid    bruit or JVD   Back:     Symmetric, no curvature, ROM normal, no CVA tenderness   Lungs:     Clear to auscultation bilaterally, respirations unlabored   Chest Wall:    No tenderness or deformity    Heart:  S1-S2 tachycardiac "   Abdomen:     Soft, non-tender, bowel sounds active all four quadrants,     no masses, no hepatomegaly, no splenomegaly   Extremities:   Extremities normal, atraumatic, no cyanosis or edema   Pulses:   Pulses palpable in all extremities; symmetric all extremities   Skin:   Skin color normal, Skin is warm and dry,  no rashes or palpable lesions   Neurologic:   CNII-XII intact, motor strength grossly intact, sensation grossly intact to light touch, no focal deficits noted       Data Review:      I reviewed the patient's new clinical results.  Results from last 7 days   Lab Units 04/19/25  1541   WBC 10*3/mm3 9.03   HEMOGLOBIN g/dL 16.0   PLATELETS 10*3/mm3 187     Results from last 7 days   Lab Units 04/19/25  1541   SODIUM mmol/L 139   POTASSIUM mmol/L 3.1*   CHLORIDE mmol/L 98   CO2 mmol/L 24.5   BUN mg/dL 15   CREATININE mg/dL 1.42*   CALCIUM mg/dL 10.1   GLUCOSE mg/dL 140*     CT Head Without Contrast  Result Date: 4/19/2025   No acute intracranial hemorrhage or hydrocephalus. If there is further clinical concern, MRI could be considered for further evaluation.  This report was finalized on 4/19/2025 5:20 PM by Dr. Yoav Parra M.D on Workstation: [employer]      XR Chest 1 View  Result Date: 4/19/2025  No focal pulmonary consolidation. Tortuous aorta. Follow-up as clinically indicated.  This report was finalized on 4/19/2025 4:28 PM by Dr. Yoav Parra M.D on Workstation: [employer]        Assessment:  Active Hospital Problems    Diagnosis  POA    **Generalized weakness [R53.1]  Yes    Tachycardia [R00.0]  Unknown    HTN (hypertension) [I10]  Unknown    Dyslipidemia [E78.5]  Unknown    Anxiety [F41.9]  Unknown       Medical decision making/care plan: See admitting orders  Episodic dizziness, flushed feeling, nausea sweating and generalized weakness predominantly when he is upright and resolves upon resting with workup revealing significant tachycardia renal insufficiency and hypokalemia while on  losartan/hydrochlorothiazide and recent attempt at diet-this could very well be progressive dehydration and volume depletion causing this presentation but other etiologies such as endocrine issues such as thyrotoxicosis versus pheochromocytoma needs to be ruled out.  Plan is to hydrate him carefully, replace his potassium, monitor his orthostatics and check his TSH level.  Repeat his lactic acid level after fluids and provide him with telemetry monitoring.  Possible anxiety/panic episodes-patient has history of anxiety and currently on Zoloft-plan is to continue his current regimen and if workup for the organic causes of this presentation is unrevealing will consider psychiatric evaluation.  History of hypertension-continue his current regimen while keeping close eye on his renal function avoid nephrotoxic agent and hypotensive episode and monitor his blood pressure while he is getting IV fluids.  Chronic kidney disease-multifactorial-continue with IV fluids and keep an eye on his creatinine.  His creatinine continue to rise may consider using different class of antihypertensive than losartan and hydrochlorothiazide combination.  Mild hyperglycemia-check hemoglobin A1c to make sure the patient is not having symptoms related to hyperglycemia and new onset diabetes.      Rebekah Adams MD   4/19/2025  19:03 EDT    Parts of this note may be an electronic transcription/translation of spoken language to printed text using the Dragon dictation system.

## 2025-04-20 LAB
ALBUMIN SERPL-MCNC: 3.8 G/DL (ref 3.5–5.2)
ALBUMIN/GLOB SERPL: 1.8 G/DL
ALP SERPL-CCNC: 68 U/L (ref 39–117)
ALT SERPL W P-5'-P-CCNC: 22 U/L (ref 1–41)
ANION GAP SERPL CALCULATED.3IONS-SCNC: 10 MMOL/L (ref 5–15)
AST SERPL-CCNC: 18 U/L (ref 1–40)
BASOPHILS # BLD AUTO: 0.02 10*3/MM3 (ref 0–0.2)
BASOPHILS NFR BLD AUTO: 0.4 % (ref 0–1.5)
BILIRUB SERPL-MCNC: 0.4 MG/DL (ref 0–1.2)
BUN SERPL-MCNC: 14 MG/DL (ref 8–23)
BUN/CREAT SERPL: 12.8 (ref 7–25)
CALCIUM SPEC-SCNC: 8.5 MG/DL (ref 8.6–10.5)
CHLORIDE SERPL-SCNC: 103 MMOL/L (ref 98–107)
CO2 SERPL-SCNC: 25 MMOL/L (ref 22–29)
CREAT SERPL-MCNC: 1.09 MG/DL (ref 0.76–1.27)
D DIMER PPP FEU-MCNC: <0.27 MCGFEU/ML (ref 0–0.65)
D-LACTATE SERPL-SCNC: 1.4 MMOL/L (ref 0.5–2)
DEPRECATED RDW RBC AUTO: 45.9 FL (ref 37–54)
EGFRCR SERPLBLD CKD-EPI 2021: 75.3 ML/MIN/1.73
EOSINOPHIL # BLD AUTO: 0.15 10*3/MM3 (ref 0–0.4)
EOSINOPHIL NFR BLD AUTO: 3 % (ref 0.3–6.2)
ERYTHROCYTE [DISTWIDTH] IN BLOOD BY AUTOMATED COUNT: 13 % (ref 12.3–15.4)
GLOBULIN UR ELPH-MCNC: 2.1 GM/DL
GLUCOSE SERPL-MCNC: 96 MG/DL (ref 65–99)
HCT VFR BLD AUTO: 37.8 % (ref 37.5–51)
HGB BLD-MCNC: 13 G/DL (ref 13–17.7)
IMM GRANULOCYTES # BLD AUTO: 0.01 10*3/MM3 (ref 0–0.05)
IMM GRANULOCYTES NFR BLD AUTO: 0.2 % (ref 0–0.5)
LYMPHOCYTES # BLD AUTO: 2.07 10*3/MM3 (ref 0.7–3.1)
LYMPHOCYTES NFR BLD AUTO: 41.4 % (ref 19.6–45.3)
MCH RBC QN AUTO: 33.2 PG (ref 26.6–33)
MCHC RBC AUTO-ENTMCNC: 34.4 G/DL (ref 31.5–35.7)
MCV RBC AUTO: 96.4 FL (ref 79–97)
MONOCYTES # BLD AUTO: 0.46 10*3/MM3 (ref 0.1–0.9)
MONOCYTES NFR BLD AUTO: 9.2 % (ref 5–12)
NEUTROPHILS NFR BLD AUTO: 2.29 10*3/MM3 (ref 1.7–7)
NEUTROPHILS NFR BLD AUTO: 45.8 % (ref 42.7–76)
NRBC BLD AUTO-RTO: 0 /100 WBC (ref 0–0.2)
PLATELET # BLD AUTO: 142 10*3/MM3 (ref 140–450)
PMV BLD AUTO: 10.4 FL (ref 6–12)
POTASSIUM SERPL-SCNC: 4 MMOL/L (ref 3.5–5.2)
POTASSIUM SERPL-SCNC: 4.4 MMOL/L (ref 3.5–5.2)
PROT SERPL-MCNC: 5.9 G/DL (ref 6–8.5)
RBC # BLD AUTO: 3.92 10*6/MM3 (ref 4.14–5.8)
SODIUM SERPL-SCNC: 138 MMOL/L (ref 136–145)
T4 FREE SERPL-MCNC: 1.25 NG/DL (ref 0.92–1.68)
TSH SERPL DL<=0.05 MIU/L-ACNC: 3.86 UIU/ML (ref 0.27–4.2)
WBC NRBC COR # BLD AUTO: 5 10*3/MM3 (ref 3.4–10.8)

## 2025-04-20 PROCEDURE — 84132 ASSAY OF SERUM POTASSIUM: CPT | Performed by: INTERNAL MEDICINE

## 2025-04-20 PROCEDURE — 84439 ASSAY OF FREE THYROXINE: CPT | Performed by: INTERNAL MEDICINE

## 2025-04-20 PROCEDURE — 25810000003 SODIUM CHLORIDE 0.9 % SOLUTION: Performed by: INTERNAL MEDICINE

## 2025-04-20 PROCEDURE — 86316 IMMUNOASSAY TUMOR OTHER: CPT | Performed by: STUDENT IN AN ORGANIZED HEALTH CARE EDUCATION/TRAINING PROGRAM

## 2025-04-20 PROCEDURE — 85025 COMPLETE CBC W/AUTO DIFF WBC: CPT | Performed by: INTERNAL MEDICINE

## 2025-04-20 PROCEDURE — 85379 FIBRIN DEGRADATION QUANT: CPT | Performed by: HOSPITALIST

## 2025-04-20 PROCEDURE — 80053 COMPREHEN METABOLIC PANEL: CPT | Performed by: INTERNAL MEDICINE

## 2025-04-20 PROCEDURE — 84443 ASSAY THYROID STIM HORMONE: CPT | Performed by: INTERNAL MEDICINE

## 2025-04-20 PROCEDURE — 83605 ASSAY OF LACTIC ACID: CPT | Performed by: INTERNAL MEDICINE

## 2025-04-20 PROCEDURE — 99222 1ST HOSP IP/OBS MODERATE 55: CPT | Performed by: STUDENT IN AN ORGANIZED HEALTH CARE EDUCATION/TRAINING PROGRAM

## 2025-04-20 RX ADMIN — PANTOPRAZOLE SODIUM 40 MG: 40 TABLET, DELAYED RELEASE ORAL at 05:44

## 2025-04-20 RX ADMIN — ATORVASTATIN CALCIUM 80 MG: 80 TABLET, FILM COATED ORAL at 08:36

## 2025-04-20 RX ADMIN — METOPROLOL TARTRATE 50 MG: 50 TABLET, FILM COATED ORAL at 21:17

## 2025-04-20 RX ADMIN — Medication 10 ML: at 08:38

## 2025-04-20 RX ADMIN — POTASSIUM CHLORIDE 40 MEQ: 1500 TABLET, EXTENDED RELEASE ORAL at 05:44

## 2025-04-20 RX ADMIN — HYDROCHLOROTHIAZIDE 12.5 MG: 12.5 TABLET ORAL at 08:36

## 2025-04-20 RX ADMIN — METOPROLOL TARTRATE 50 MG: 50 TABLET, FILM COATED ORAL at 08:36

## 2025-04-20 RX ADMIN — SODIUM CHLORIDE 125 ML/HR: 9 INJECTION, SOLUTION INTRAVENOUS at 05:44

## 2025-04-20 RX ADMIN — SERTRALINE HYDROCHLORIDE 50 MG: 50 TABLET, FILM COATED ORAL at 08:36

## 2025-04-20 RX ADMIN — LOSARTAN POTASSIUM 50 MG: 50 TABLET, FILM COATED ORAL at 08:36

## 2025-04-20 RX ADMIN — Medication 10 ML: at 21:18

## 2025-04-20 RX ADMIN — POTASSIUM CHLORIDE 40 MEQ: 1500 TABLET, EXTENDED RELEASE ORAL at 00:24

## 2025-04-20 NOTE — PLAN OF CARE
Goal Outcome Evaluation:                 No acute issues overnight. Call light within reach. Plan of care ongoing. Potasium replacement protocol started. A/o x 4. R.A

## 2025-04-20 NOTE — CONSULTS
"      [facility]        Patient Name: Bud Camacho  Age/Sex: 64 y.o. male  : 1960  MRN: [id number]    Date of Admission: 2025  Date of Encounter Visit: 25  Encounter Provider: Rodrigo Zacarias MD  Referring Provider: Rebekah Adams MD  Place of Service: [facility] CARDIOLOGY  Patient Care Team:  Antonietta Romero APRN as PCP - General (Nurse Practitioner)    Subjective:     Chief Complaint: Dizziness and intermittent flushing    Reason for consult: Sinus tachycardia       History of Present Illness:  Bud Camacho is a 64 y.o. male who presents for further evaluation of intermittent sinus tachycardia spells in the setting of flushing, diaphoresis and dizziness.    He has a history of hypertension hyperlipidemia, and over the last 2 weeks or so he is had these worsening spells which he describes as sweating, tremors, and lightheadedness with standing, this was accompanied with some anxiousness as well as tremors.  He has diffuse weakness during the spells.  He recalls the first event occurred during a stressful experience at a bank, restarted became lightheaded, flushed, maybe with some nausea.  No changes to his bowel or bladder habits.  He felt weak and had to be escorted to his car.  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  He does not think that he was dehydrated but does not recall drinking a lot of fluids.  That spell was quite profound and because of the prominence of that he came to the ER for further evaluation    He initially had an elevated lactate of 2.5 with a creatinine 1.42 that did respond to IV fluids.  Other testing was unremarkable including a normal " "sed rate and procalcitonin.    He reports that he has been on Zoloft for 6 months or so.  Over the last few months, he has tried to diet, exercise and lose weight.  He reports he has modified his diet and eats a lot of berries.  He denies any other new medication changes.  He did recently try to lose weight, he reports he \"took a system flush pill\" about 2 weeks ago around the time the symptoms started, but he is not taking it since the symptoms persist.    Prior Cardiac Testing:  None      Past Medical History:  Past Medical History:   Diagnosis Date    Hyperlipidemia     Hypertension        History reviewed. No pertinent surgical history.    Home Medications:   Medications Prior to Admission   Medication Sig Dispense Refill Last Dose/Taking    metoprolol tartrate (LOPRESSOR) 50 MG tablet Take 1 tablet by mouth 2 (Two) Times a Day.   Taking    omeprazole (priLOSEC) 40 MG capsule Take 1 capsule by mouth Daily.   Taking    atorvastatin (LIPITOR) 80 MG tablet Take 1 tablet by mouth Daily.       losartan-hydrochlorothiazide (HYZAAR) 50-12.5 MG per tablet Take  by mouth Daily.       sertraline (ZOLOFT) 50 MG tablet Take 1 tablet by mouth Daily.          Allergies:  Allergies   Allergen Reactions    Amlodipine Swelling    Lisinopril Other (See Comments)     drowsy    Olmesartan Other (See Comments)     Sexual dysfunction       Past Social History:  Social History     Socioeconomic History    Marital status:    Tobacco Use    Smoking status: Never    Smokeless tobacco: Current     Types: Chew   Vaping Use    Vaping status: Never Used   Substance and Sexual Activity    Alcohol use: Yes     Alcohol/week: 4.0 standard drinks of alcohol     Types: 4 Cans of beer per week     Comment: 5/7 days/wk    Drug use: Never       Past Family History:  History reviewed. No pertinent family history.    REVIEW OF SYSTEMS:   14 point ROS was performed and is negative except as outlined in HPI          Objective:   Temp:  [98.1 °F " (36.7 °C)-98.8 °F (37.1 °C)] 98.1 °F (36.7 °C)  Heart Rate:  [] 75  Resp:  [16-18] 16  BP: (117-146)/() 124/86     Intake/Output Summary (Last 24 hours) at 4/20/2025 1313  Last data filed at 4/20/2025 0900  Gross per 24 hour   Intake 1410 ml   Output --   Net 1410 ml     Body mass index is 29 kg/m².      04/19/25  1548 04/19/25  1950 04/20/25  0530   Weight: 88.5 kg (195 lb 1.7 oz) 92 kg (202 lb 14.4 oz) 91.7 kg (202 lb 1.6 oz)     Weight change:     General Appearance:    Alert, cooperative, in no acute distress   Throat:   oral mucosa moist   Neck:   supple, trachea midline, no thyromegaly, no carotid bruit, no JVD   Lungs:     Clear to auscultation,respirations regular, even and unlabored.    Heart:    Regular rhythm and normal rate, normal S1 and S2, no murmur, no gallop, no rub, no click   Chest Wall:    No abnormalities observed   Abdomen:     nondistended, no guarding, no rebound  tenderness   Extremities:   Moves all extremities well, no significant edema, no cyanosis, no redness, small amount of clamminess to hands   Pulses:   Pulses palpable and equal bilaterally. Normal radial, carotid, femoral, dorsalis pedis and posterior tibial pulses bilaterally.    Skin:  Psychiatric:   No bleeding, bruising or rash    Alert and oriented x 3, normal mood and affect     Lab Review:   Results from last 7 days   Lab Units 04/20/25  0844 04/20/25  0611 04/19/25  1541   SODIUM mmol/L  --  138 139   POTASSIUM mmol/L 4.4 4.0 3.1*   CHLORIDE mmol/L  --  103 98   CO2 mmol/L  --  25.0 24.5   BUN mg/dL  --  14 15   CREATININE mg/dL  --  1.09 1.42*   GLUCOSE mg/dL  --  96 140*   CALCIUM mg/dL  --  8.5* 10.1   AST (SGOT) U/L  --  18 21   ALT (SGPT) U/L  --  22 28     Results from last 7 days   Lab Units 04/19/25  1639 04/19/25  1541   HSTROP T ng/L 16 15     Results from last 7 days   Lab Units 04/20/25  0611 04/19/25  1541   WBC 10*3/mm3 5.00 9.03   HEMOGLOBIN g/dL 13.0 16.0   HEMATOCRIT % 37.8 45.6   PLATELETS  "10*3/mm3 142 187     Results from last 7 days   Lab Units 04/19/25  1604   INR  1.04   APTT seconds 23.3               Invalid input(s): \"LDLCALC\"      Results from last 7 days   Lab Units 04/20/25  0611   TSH uIU/mL 3.860       Echo EF Estimated  No results found for: \"ECHOEFEST\"    EKG:   I personally viewed and interpreted the patient's EKG     Imaging:  Imaging Results (Most Recent)       Procedure Component Value Units Date/Time    CT Head Without Contrast [[id number]] Collected: 04/19/25 1718     Updated: 04/19/25 1723    Narrative:      CT HEAD WO CONTRAST-     INDICATIONS: Weakness     TECHNIQUE: Radiation dose reduction techniques were utilized, including  automated exposure control and exposure modulation based on body size.  Noncontrast head CT     COMPARISON: 10/22/2022     FINDINGS:           No acute intracranial hemorrhage, midline shift or mass effect. No acute  territorial infarct is identified.     Arterial calcifications are seen at the base of the brain.     Ventricles, cisterns, cerebral sulci are unremarkable for patient age.     Small likely mucous retention cyst or polyp in left maxillary sinus. The  visualized paranasal sinuses, orbits, mastoid air cells are  unremarkable.          Impression:         No acute intracranial hemorrhage or hydrocephalus. If there is further  clinical concern, MRI could be considered for further evaluation.     This report was finalized on 4/19/2025 5:20 PM by Dr. Yoav Parra M.D on Workstation: [id number]       XR Chest 1 View [[id number]] Collected: 04/19/25 1625     Updated: 04/19/25 1631    Narrative:      XR CHEST 1 VW-     HISTORY: Male who is 64 years-old, weakness     TECHNIQUE: Frontal view of the chest     COMPARISON: None available     FINDINGS: The heart size is normal. Aorta is tortuous. Pulmonary  vasculature is unremarkable. No focal pulmonary consolidation, pleural  effusion, or pneumothorax. No acute osseous process.       Impression:      No " "focal pulmonary consolidation. Tortuous aorta. Follow-up  as clinically indicated.     This report was finalized on 4/19/2025 4:28 PM by Dr. Yoav Parra M.D on Workstation: [id number]                   Assessment:     Active Hospital Problems    Diagnosis  POA    **Generalized weakness [R53.1]  Yes    Tachycardia [R00.0]  Unknown    HTN (hypertension) [I10]  Unknown    Dyslipidemia [E78.5]  Unknown    Anxiety [F41.9]  Unknown      Resolved Hospital Problems   No resolved problems to display.          Plan:     Sinus tachycardia: Suspicion for primary cardiac cause is low, workup per below  Echo is reasonable to rule out structural heart disease and possible torturous aorta noted on chest x-ray  Intermittent lightheadedness with flushing with intermittent dizziness anxiousness and tremulous: ESR/CRP unremarkable.  Etiology remains unclear, possibly could be related to stress and vasovagal episodes that occur sporadically without warning.  Will assess for organic cause and obtain lab testing for carcinoid/serotonin and assess metanephrines for pheo  Reviewed thyroid testing, unremarkable  Other etiology could be just related to vasovagal episodes.  FE: Resolved with IV fluids.  This could be related to dehydration secondary to yardwork during the heat yesterday.  Elevated lactate also resolved which could point towards dehydration  Intermittent alcohol use.  He denies any heavy use and states maybe just 1 drink a day to \"take the edge off.\"  I did advise patient that at least until the testing is completed, or until the symptoms calm down is better to avoid alcohol altogether to avoid any confounders    Thank you for allowing me to participate in the care of Bud Camacho.  Arrange for above testing, will follow testing results.  Feel free to contact me directly with any further questions or concerns.    Rodrigo Zacarias MD  [facility]  04/20/25  12:18 EDT      Part of this note may be an " electronic transcription/translation of spoken language to printed text using the Dragon Dictation System.

## 2025-04-20 NOTE — PLAN OF CARE
Goal Outcome Evaluation:  Plan of Care Reviewed With: patient        Progress: improving        VSS.  A-Ox4.  No acute changes this shift.  24 hour urine started @ 1350.

## 2025-04-20 NOTE — PROGRESS NOTES
[facility]     LOS: 1 day     Subjective:    CC:Fatigue ( ) and Weakness - Generalized    DIET:  Diet Order   Procedures    Diet: Cardiac; Healthy Heart (2-3 Na+); Fluid Consistency: Thin (IDDSI 0)       Objective:    Vital Signs:  Temp:  [98.1 °F (36.7 °C)-98.7 °F (37.1 °C)] 98.7 °F (37.1 °C)  Heart Rate:  [] 76  Resp:  [16-18] 16  BP: (114-146)/() 114/83    SpO2:  [95 %-100 %] 98 %  on   ;   Device (Oxygen Therapy): room air  Body mass index is 29 kg/m².    Physical Exam  Constitutional:       General: He is not in acute distress.  Cardiovascular:      Rate and Rhythm: Normal rate and regular rhythm.   Pulmonary:      Effort: Pulmonary effort is normal.      Breath sounds: Normal breath sounds.   Abdominal:      General: There is no distension.      Palpations: Abdomen is soft.      Tenderness: There is no abdominal tenderness.   Skin:     General: Skin is warm and dry.   Neurological:      General: No focal deficit present.      Mental Status: He is alert.   Psychiatric:         Mood and Affect: Mood normal.         Behavior: Behavior normal.         Results Review:    Glucose   Date Value Ref Range Status   04/20/2025 96 65 - 99 mg/dL Final   04/19/2025 140 (H) 65 - 99 mg/dL Final     Results from last 7 days   Lab Units 04/20/25  0611   WBC 10*3/mm3 5.00   HEMOGLOBIN g/dL 13.0   HEMATOCRIT % 37.8   PLATELETS 10*3/mm3 142     Results from last 7 days   Lab Units 04/20/25  0844 04/20/25  0611   SODIUM mmol/L  --  138   POTASSIUM mmol/L 4.4 4.0   CHLORIDE mmol/L  --  103   CO2 mmol/L  --  25.0   BUN mg/dL  --  14   CREATININE mg/dL  --  1.09   CALCIUM mg/dL  --  8.5*   BILIRUBIN mg/dL  --  0.4   ALK PHOS U/L  --  68   ALT (SGPT) U/L  --  22   AST (SGOT) U/L  --  18   GLUCOSE mg/dL  --  96     Results from last 7 days   Lab Units 04/19/25  1604   INR  1.04   APTT seconds 23.3         Results from last 7 days   Lab Units 04/19/25  1639 04/19/25  1541   HSTROP T ng/L 16 15  "    Cultures:  No results found for: \"BLOODCX\", \"URINECX\", \"WOUNDCX\", \"MRSACX\", \"RESPCX\", \"STOOLCX\"    I have reviewed daily medications and changes in CPOE    Scheduled meds  atorvastatin, 80 mg, Oral, Daily  losartan, 50 mg, Oral, Q24H  metoprolol tartrate, 50 mg, Oral, BID  pantoprazole, 40 mg, Oral, Q AM  sertraline, 50 mg, Oral, Daily  sodium chloride, 10 mL, Intravenous, Q12H        sodium chloride, 125 mL/hr, Last Rate: 125 mL/hr (04/20/25 0544)      PRN meds    acetaminophen **OR** acetaminophen **OR** acetaminophen    Calcium Replacement - Follow Nurse / BPA Driven Protocol    Magnesium Standard Dose Replacement - Follow Nurse / BPA Driven Protocol    nitroglycerin    ondansetron ODT **OR** ondansetron    Phosphorus Replacement - Follow Nurse / BPA Driven Protocol    Potassium Replacement - Follow Nurse / BPA Driven Protocol    [COMPLETED] Insert Peripheral IV **AND** sodium chloride    sodium chloride    sodium chloride        Generalized weakness    Tachycardia    HTN (hypertension)    Dyslipidemia    Anxiety        Assessment/Plan:  Sinus tachycardia on admission which resolved with fluids  - Stopped HCTZ  - Likely in part related to dehydration  - Checking carcinoid/serotonin/metanephrine markers    Intermittent flushing spells  - Cardiology does not feel likely cardiac primary  - Checking echocardiogram    Acute kidney injury has resolved    Possible discharge tomorrow and will have to follow-up with lab work outpatient with either cardiology or primary care doctor      Srikanth Ruff MD  04/20/25  17:23 EDT      "

## 2025-04-21 ENCOUNTER — APPOINTMENT (OUTPATIENT)
Dept: CARDIOLOGY | Facility: HOSPITAL | Age: 65
DRG: 683 | End: 2025-04-21
Payer: MEDICARE

## 2025-04-21 VITALS
BODY MASS INDEX: 29.67 KG/M2 | SYSTOLIC BLOOD PRESSURE: 147 MMHG | TEMPERATURE: 98.9 F | WEIGHT: 207.23 LBS | HEIGHT: 70 IN | HEART RATE: 67 BPM | DIASTOLIC BLOOD PRESSURE: 92 MMHG | OXYGEN SATURATION: 100 % | RESPIRATION RATE: 16 BRPM

## 2025-04-21 LAB
AORTIC DIMENSIONLESS INDEX: 0.9 (DI)
ASCENDING AORTA: 3.4 CM
AV MEAN PRESS GRAD SYS DOP V1V2: 3 MMHG
AV VMAX SYS DOP: 116.9 CM/SEC
BH CV ECHO MEAS - ACS: 1.9 CM
BH CV ECHO MEAS - AO MAX PG: 5.5 MMHG
BH CV ECHO MEAS - AO ROOT DIAM: 2.7 CM
BH CV ECHO MEAS - AO V2 VTI: 24.9 CM
BH CV ECHO MEAS - AVA(I,D): 2.9 CM2
BH CV ECHO MEAS - EDV(CUBED): 72.6 ML
BH CV ECHO MEAS - EDV(MOD-SP2): 73 ML
BH CV ECHO MEAS - EDV(MOD-SP4): 109 ML
BH CV ECHO MEAS - EF(MOD-SP2): 72.6 %
BH CV ECHO MEAS - EF(MOD-SP4): 66.1 %
BH CV ECHO MEAS - ESV(CUBED): 29.3 ML
BH CV ECHO MEAS - ESV(MOD-SP2): 20 ML
BH CV ECHO MEAS - ESV(MOD-SP4): 37 ML
BH CV ECHO MEAS - FS: 26.1 %
BH CV ECHO MEAS - IVS/LVPW: 0.78 CM
BH CV ECHO MEAS - IVSD: 1.01 CM
BH CV ECHO MEAS - LAT PEAK E' VEL: 10.4 CM/SEC
BH CV ECHO MEAS - LV DIASTOLIC VOL/BSA (35-75): 51.6 CM2
BH CV ECHO MEAS - LV MASS(C)D: 165 GRAMS
BH CV ECHO MEAS - LV MAX PG: 5.2 MMHG
BH CV ECHO MEAS - LV MEAN PG: 2.7 MMHG
BH CV ECHO MEAS - LV SYSTOLIC VOL/BSA (12-30): 17.5 CM2
BH CV ECHO MEAS - LV V1 MAX: 113.7 CM/SEC
BH CV ECHO MEAS - LV V1 VTI: 22.4 CM
BH CV ECHO MEAS - LVIDD: 4.2 CM
BH CV ECHO MEAS - LVIDS: 3.1 CM
BH CV ECHO MEAS - LVOT AREA: 3.2 CM2
BH CV ECHO MEAS - LVOT DIAM: 2.03 CM
BH CV ECHO MEAS - LVPWD: 1.29 CM
BH CV ECHO MEAS - MED PEAK E' VEL: 8.6 CM/SEC
BH CV ECHO MEAS - MV A DUR: 0.11 SEC
BH CV ECHO MEAS - MV A MAX VEL: 83.3 CM/SEC
BH CV ECHO MEAS - MV DEC SLOPE: 385 CM/SEC2
BH CV ECHO MEAS - MV DEC TIME: 0.15 SEC
BH CV ECHO MEAS - MV E MAX VEL: 86.5 CM/SEC
BH CV ECHO MEAS - MV E/A: 1.04
BH CV ECHO MEAS - MV MAX PG: 5.1 MMHG
BH CV ECHO MEAS - MV MEAN PG: 1.54 MMHG
BH CV ECHO MEAS - MV P1/2T: 85.7 MSEC
BH CV ECHO MEAS - MV V2 VTI: 35.2 CM
BH CV ECHO MEAS - MVA(P1/2T): 2.6 CM2
BH CV ECHO MEAS - MVA(VTI): 2.07 CM2
BH CV ECHO MEAS - PA ACC TIME: 0.15 SEC
BH CV ECHO MEAS - PA V2 MAX: 78.4 CM/SEC
BH CV ECHO MEAS - PULM A REVS DUR: 0.17 SEC
BH CV ECHO MEAS - PULM A REVS VEL: 29.5 CM/SEC
BH CV ECHO MEAS - PULM DIAS VEL: 23.2 CM/SEC
BH CV ECHO MEAS - PULM S/D: 0.9
BH CV ECHO MEAS - PULM SYS VEL: 20.8 CM/SEC
BH CV ECHO MEAS - QP/QS: 0.75
BH CV ECHO MEAS - RV MAX PG: 1.4 MMHG
BH CV ECHO MEAS - RV V1 MAX: 59.2 CM/SEC
BH CV ECHO MEAS - RV V1 VTI: 15.6 CM
BH CV ECHO MEAS - RVOT DIAM: 2.11 CM
BH CV ECHO MEAS - SV(LVOT): 72.9 ML
BH CV ECHO MEAS - SV(MOD-SP2): 53 ML
BH CV ECHO MEAS - SV(MOD-SP4): 72 ML
BH CV ECHO MEAS - SV(RVOT): 54.4 ML
BH CV ECHO MEAS - SVI(LVOT): 34.5 ML/M2
BH CV ECHO MEAS - SVI(MOD-SP2): 25.1 ML/M2
BH CV ECHO MEAS - SVI(MOD-SP4): 34.1 ML/M2
BH CV ECHO MEASUREMENTS AVERAGE E/E' RATIO: 9.11
BH CV XLRA - RV BASE: 3.7 CM
BH CV XLRA - RV LENGTH: 8.4 CM
BH CV XLRA - RV MID: 3.8 CM
BH CV XLRA - TDI S': 11.7 CM/SEC
LEFT ATRIUM VOLUME INDEX: 17.4 ML/M2
LV EF BIPLANE MOD: 70.4 %
QT INTERVAL: 316 MS
QTC INTERVAL: 462 MS
SINUS: 3.2 CM
STJ: 2.9 CM

## 2025-04-21 PROCEDURE — 81050 URINALYSIS VOLUME MEASURE: CPT | Performed by: STUDENT IN AN ORGANIZED HEALTH CARE EDUCATION/TRAINING PROGRAM

## 2025-04-21 PROCEDURE — 83497 ASSAY OF 5-HIAA: CPT | Performed by: STUDENT IN AN ORGANIZED HEALTH CARE EDUCATION/TRAINING PROGRAM

## 2025-04-21 PROCEDURE — 25510000001 PERFLUTREN 6.52 MG/ML SUSPENSION 2 ML VIAL: Performed by: HOSPITALIST

## 2025-04-21 PROCEDURE — 83835 ASSAY OF METANEPHRINES: CPT | Performed by: STUDENT IN AN ORGANIZED HEALTH CARE EDUCATION/TRAINING PROGRAM

## 2025-04-21 PROCEDURE — 93306 TTE W/DOPPLER COMPLETE: CPT | Performed by: INTERNAL MEDICINE

## 2025-04-21 PROCEDURE — 99232 SBSQ HOSP IP/OBS MODERATE 35: CPT | Performed by: INTERNAL MEDICINE

## 2025-04-21 PROCEDURE — 93246 EXT ECG>7D<15D RECORDING: CPT

## 2025-04-21 PROCEDURE — 93306 TTE W/DOPPLER COMPLETE: CPT

## 2025-04-21 RX ORDER — LOSARTAN POTASSIUM 50 MG/1
50 TABLET ORAL
Qty: 30 TABLET | Refills: 0 | Status: SHIPPED | OUTPATIENT
Start: 2025-04-22

## 2025-04-21 RX ADMIN — LOSARTAN POTASSIUM 50 MG: 50 TABLET, FILM COATED ORAL at 08:31

## 2025-04-21 RX ADMIN — Medication 10 ML: at 08:32

## 2025-04-21 RX ADMIN — SERTRALINE HYDROCHLORIDE 50 MG: 50 TABLET, FILM COATED ORAL at 08:32

## 2025-04-21 RX ADMIN — METOPROLOL TARTRATE 50 MG: 50 TABLET, FILM COATED ORAL at 08:31

## 2025-04-21 RX ADMIN — PERFLUTREN 2 ML: 6.52 INJECTION, SUSPENSION INTRAVENOUS at 13:47

## 2025-04-21 RX ADMIN — PANTOPRAZOLE SODIUM 40 MG: 40 TABLET, DELAYED RELEASE ORAL at 05:48

## 2025-04-21 RX ADMIN — ATORVASTATIN CALCIUM 80 MG: 80 TABLET, FILM COATED ORAL at 08:32

## 2025-04-21 NOTE — PLAN OF CARE
Goal Outcome Evaluation:              Outcome Evaluation: no acute issues overnight. 24 hour urine collection ongoing. possible DC today. call light within reach. plan of care ongoing. a/o x 4. R.A

## 2025-04-21 NOTE — PAYOR COMM NOTE
"Asad Camacho (64 y.o. Male)     ATTN: NURSE REVIEWER  RE: INITIAL INPT AUTH CLINICALS   REF: [id number]  PLS REPLY TO LEYLA JACOBO [phone] OR FAX# [phone]      Date of Birth   1960    Social Security Number       [address] [facility] [place] [address]    Home Phone   [phone]    MRN   [id number]       Church   [employer]    Marital Status                               Admission Date   4/19/2025    Admission Type   Emergency    Admitting Provider   Rebekah Adams MD    Attending Provider   Srikanth Ruff MD    Department, Room/Bed   [address], [address]       Discharge Date       Discharge Disposition       Discharge Destination                                 Attending Provider: rSikanth Ruff MD    Allergies: Amlodipine, Lisinopril, Olmesartan    Isolation: None   Infection: None   Code Status: CPR    Ht: 177.8 cm (70\")   Wt: 91.7 kg (202 lb 1.6 oz)    Admission Cmt: None   Principal Problem: Generalized weakness [R53.1]                   Active Insurance as of 4/19/2025       Primary Coverage       Payor Plan Insurance Group Employer/Plan Group    ANTHEM MEDICARE REPLACEMENT ANTHEM MEDICARE ADVANTAGE HMO [id number]       Payor Plan Address Payor Plan Phone Number Payor Plan Fax Number Effective Dates    [address] [phone]  1/1/2025 - None Entered    [facility] [id number]         Subscriber Name Subscriber Birth Date Member ID       ASAD CAMACHO 1960 [id number]                     Emergency Contacts        (Rel.) Home Phone Work Phone Mobile Phone    ABAD MATOS (Sister) -- -- [phone]                 History & Physical        Rebekah Adams MD at 04/20/25 0512          H&P dictated in the consult note heading.    Electronically signed by Rebekah Adams MD at 04/20/25 0512       Facility-Administered Medications as of 4/20/2025   Medication Dose Route Frequency Provider Last Rate Last Admin    " acetaminophen (TYLENOL) tablet 650 mg  650 mg Oral Q4H PRN Rebekah Adams MD        Or    acetaminophen (TYLENOL) 160 MG/5ML oral solution 650 mg  650 mg Oral Q4H PRN Rebekah Adams MD        Or    acetaminophen (TYLENOL) suppository 650 mg  650 mg Rectal Q4H PRN Rebekah Adams MD        atorvastatin (LIPITOR) tablet 80 mg  80 mg Oral Daily Rebekah Adams MD   80 mg at 04/20/25 0836    Calcium Replacement - Follow Nurse / BPA Driven Protocol   Not Applicable PRN Rebekah Adams MD        losartan (COZAAR) tablet 50 mg  50 mg Oral Q24H Rebekah Adams MD   50 mg at 04/20/25 0836    Magnesium Standard Dose Replacement - Follow Nurse / BPA Driven Protocol   Not Applicable PRN Rebekah Adams MD        metoprolol tartrate (LOPRESSOR) tablet 50 mg  50 mg Oral BID Rebekah Adams MD   50 mg at 04/20/25 2117    nitroglycerin (NITROSTAT) SL tablet 0.4 mg  0.4 mg Sublingual Q5 Min PRN Rebekah Adams MD        ondansetron ODT (ZOFRAN-ODT) disintegrating tablet 4 mg  4 mg Oral Q6H PRN Rebekah Adams MD        Or    ondansetron (ZOFRAN) injection 4 mg  4 mg Intravenous Q6H PRN Rebekah Adams MD        pantoprazole (PROTONIX) EC tablet 40 mg  40 mg Oral Q AM Rebekah Adams MD   40 mg at 04/20/25 0544    Phosphorus Replacement - Follow Nurse / BPA Driven Protocol   Not Applicable PRN Rebekah Adams MD        [COMPLETED] potassium chloride (KLOR-CON M20) CR tablet 40 mEq  40 mEq Oral Q4H Rebekah Adams MD   40 mEq at 04/20/25 0544    Potassium Replacement - Follow Nurse / BPA Driven Protocol   Not Applicable PRN Rebekah Adams MD        sertraline (ZOLOFT) tablet 50 mg  50 mg Oral Daily Rebekah Adams MD   50 mg at 04/20/25 0836    [COMPLETED] sodium chloride 0.9 % bolus 1,000 mL  1,000 mL Intravenous Once Parmjit Fan MD   Stopped at 04/19/25 1847    sodium chloride 0.9 % flush 10 mL  10 mL Intravenous PRN Rebekah Adams MD        sodium chloride 0.9 % flush 10 mL  10 mL Intravenous Q12H Rebekah Adams MD   10 mL at 04/20/25 2118    sodium  "chloride 0.9 % flush 10 mL  10 mL Intravenous PRN Rebekah Adams MD        sodium chloride 0.9 % infusion 40 mL  40 mL Intravenous PRN Rebekah Adams MD        sodium chloride 0.9 % infusion  125 mL/hr Intravenous Continuous Rebekah Adams  mL/hr at 04/20/25 0544 125 mL/hr at 04/20/25 0544     Lab Results (last 24 hours)       Procedure Component Value Units Date/Time    Chromogranin A [[id number]] Collected: 04/20/25 1443    Specimen: Blood Updated: 04/20/25 1456    D-dimer, Quantitative [[id number]]  (Normal) Collected: 04/20/25 1215    Specimen: Blood Updated: 04/20/25 1311     D-Dimer, Quantitative <0.27 MCGFEU/mL     Narrative:      According to the assay 's published package insert, a normal (<0.50 MCGFEU/mL) D-dimer result in conjunction with a non-high clinical probability assessment, excludes deep vein thrombosis (DVT) and pulmonary embolism (PE) with high sensitivity.    D-dimer values increase with age and this can make VTE exclusion of an older population difficult. To address this, the American College of Physicians, based on best available evidence and recent guidelines, recommends that clinicians use age-adjusted D-dimer thresholds in patients greater than 50 years of age with: a) a low probability of PE who do not meet all Pulmonary Embolism Rule Out Criteria, or b) in those with intermediate probability of PE.   The formula for an age-adjusted D-dimer cut-off is \"age/100\".  For example, a 60 year old patient would have an age-adjusted cut-off of 0.60 MCGFEU/mL and an 80 year old 0.80 MCGFEU/mL.    Potassium [[id number]]  (Normal) Collected: 04/20/25 0844    Specimen: Blood Updated: 04/20/25 0941     Potassium 4.4 mmol/L     Comprehensive Metabolic Panel [[id number]]  (Abnormal) Collected: 04/20/25 0611    Specimen: Blood Updated: 04/20/25 0709     Glucose 96 mg/dL      BUN 14 mg/dL      Creatinine 1.09 mg/dL      Sodium 138 mmol/L      Potassium 4.0 mmol/L      Chloride 103 mmol/L      " CO2 25.0 mmol/L      Calcium 8.5 mg/dL      Total Protein 5.9 g/dL      Albumin 3.8 g/dL      ALT (SGPT) 22 U/L      AST (SGOT) 18 U/L      Alkaline Phosphatase 68 U/L      Total Bilirubin 0.4 mg/dL      Globulin 2.1 gm/dL      A/G Ratio 1.8 g/dL      BUN/Creatinine Ratio 12.8     Anion Gap 10.0 mmol/L      eGFR 75.3 mL/min/1.73     Narrative:      GFR Categories in Chronic Kidney Disease (CKD)      GFR Category          GFR (mL/min/1.73)    Interpretation  G1                     90 or greater         Normal or high (1)  G2                      60-89                Mild decrease (1)  G3a                   45-59                Mild to moderate decrease  G3b                   30-44                Moderate to severe decrease  G4                    15-29                Severe decrease  G5                    14 or less           Kidney failure          (1)In the absence of evidence of kidney disease, neither GFR category G1 or G2 fulfill the criteria for CKD.    eGFR calculation 2021 CKD-EPI creatinine equation, which does not include race as a factor    TSH [[id number]]  (Normal) Collected: 04/20/25 0611    Specimen: Blood Updated: 04/20/25 0707     TSH 3.860 uIU/mL     T4, Free [[id number]]  (Normal) Collected: 04/20/25 0611    Specimen: Blood Updated: 04/20/25 0707     Free T4 1.25 ng/dL     Lactic Acid, Plasma [[id number]]  (Normal) Collected: 04/20/25 0611    Specimen: Blood Updated: 04/20/25 0700     Lactate 1.4 mmol/L     CBC Auto Differential [[id number]]  (Abnormal) Collected: 04/20/25 0611    Specimen: Blood Updated: 04/20/25 0642     WBC 5.00 10*3/mm3      RBC 3.92 10*6/mm3      Hemoglobin 13.0 g/dL      Hematocrit 37.8 %      MCV 96.4 fL      MCH 33.2 pg      MCHC 34.4 g/dL      RDW 13.0 %      RDW-SD 45.9 fl      MPV 10.4 fL      Platelets 142 10*3/mm3      Neutrophil % 45.8 %      Lymphocyte % 41.4 %      Monocyte % 9.2 %      Eosinophil % 3.0 %      Basophil % 0.4 %      Immature Grans % 0.2 %       "Neutrophils, Absolute 2.29 10*3/mm3      Lymphocytes, Absolute 2.07 10*3/mm3      Monocytes, Absolute 0.46 10*3/mm3      Eosinophils, Absolute 0.15 10*3/mm3      Basophils, Absolute 0.02 10*3/mm3      Immature Grans, Absolute 0.01 10*3/mm3      nRBC 0.0 /100 WBC           Imaging Results (Last 24 Hours)       ** No results found for the last 24 hours. **          ECG/EMG Results (last 24 hours)       Procedure Component Value Units Date/Time    Telemetry Scan [[id number]] Resulted: 04/19/25     Updated: 04/19/25 2234    Telemetry Scan [[id number]] Resulted: 04/19/25     Updated: 04/20/25 0506    Telemetry Scan [[id number]] Resulted: 04/19/25     Updated: 04/20/25 0506          Orders (last 24 hrs)        Start     Ordered    04/20/25 1310  Metanephrines, Urine, 24 Hour - Urine, Clean Catch  Once         04/20/25 1309    04/20/25 1309  Chromogranin A  Once         04/20/25 1309    04/20/25 1309  5 HIAA, Urine, Quantitative, 24 Hour - Urine, Clean Catch  Once         04/20/25 1309    04/20/25 1132  D-dimer, Quantitative  STAT         04/20/25 1132    04/20/25 1131  Adult Transthoracic Echo Complete W/ Cont if Necessary Per Protocol  Once         04/20/25 1130    04/20/25 1129  Inpatient Cardiology Consult  Once        Specialty:  Cardiology  Provider:  Arash Nielsen MD    04/20/25 1129    04/20/25 0900  atorvastatin (LIPITOR) tablet 80 mg  Daily         04/19/25 2139 04/20/25 0900  sertraline (ZOLOFT) tablet 50 mg  Daily         04/19/25 2139 04/20/25 0900  losartan (COZAAR) tablet 50 mg  Every 24 Hours Scheduled        Placed in \"And\" Linked Group    04/19/25 2139 04/20/25 0900  hydroCHLOROthiazide tablet 12.5 mg  Every 24 Hours Scheduled,   Status:  Discontinued        Placed in \"And\" Linked Group    04/19/25 2139 04/20/25 0853  Potassium  Timed         04/19/25 1952 04/20/25 0800  Oral Care  2 Times Daily       04/19/25 2139    04/20/25 0600  pantoprazole (PROTONIX) EC tablet 40 mg  Every Early Morning " "        04/19/25 2139 04/20/25 0600  CBC Auto Differential  Morning Draw         04/19/25 2139 04/20/25 0600  Comprehensive Metabolic Panel  Morning Draw         04/19/25 2139 04/20/25 0600  Lactic Acid, Plasma  Morning Draw         04/19/25 2139 04/20/25 0600  TSH  Morning Draw         04/19/25 2139 04/20/25 0600  T4, Free  Morning Draw         04/19/25 2139 04/20/25 0000  Vital Signs  Every 4 Hours       04/19/25 2139 04/19/25 2230  metoprolol tartrate (LOPRESSOR) tablet 50 mg  2 Times Daily         04/19/25 2139 04/19/25 2230  sodium chloride 0.9 % flush 10 mL  Every 12 Hours Scheduled         04/19/25 2139 04/19/25 2230  sodium chloride 0.9 % infusion  Continuous         04/19/25 2139 04/19/25 2139  Intake & Output  Every Shift       04/19/25 2139 04/19/25 2138  sodium chloride 0.9 % flush 10 mL  As Needed         04/19/25 2139 04/19/25 2138  sodium chloride 0.9 % infusion 40 mL  As Needed         04/19/25 2139 04/19/25 2138  nitroglycerin (NITROSTAT) SL tablet 0.4 mg  Every 5 Minutes PRN         04/19/25 2139 04/19/25 2138  Potassium Replacement - Follow Nurse / BPA Driven Protocol  As Needed         04/19/25 2139 04/19/25 2138  Magnesium Standard Dose Replacement - Follow Nurse / BPA Driven Protocol  As Needed         04/19/25 2139 04/19/25 2138  Phosphorus Replacement - Follow Nurse / BPA Driven Protocol  As Needed         04/19/25 2139 04/19/25 2138  Calcium Replacement - Follow Nurse / BPA Driven Protocol  As Needed         04/19/25 2139 04/19/25 2138  acetaminophen (TYLENOL) tablet 650 mg  Every 4 Hours PRN        Placed in \"Or\" Linked Group    04/19/25 2139 04/19/25 2138  acetaminophen (TYLENOL) 160 MG/5ML oral solution 650 mg  Every 4 Hours PRN        Placed in \"Or\" Linked Group    04/19/25 2139 04/19/25 2138  acetaminophen (TYLENOL) suppository 650 mg  Every 4 Hours PRN        Placed in \"Or\" Linked Group    04/19/25 2139 04/19/25 2138  " "ondansetron ODT (ZOFRAN-ODT) disintegrating tablet 4 mg  Every 6 Hours PRN        Placed in \"Or\" Linked Group    04/19/25 2139 04/19/25 2138  ondansetron (ZOFRAN) injection 4 mg  Every 6 Hours PRN        Placed in \"Or\" Linked Group    04/19/25 2139 04/19/25 2045  potassium chloride (KLOR-CON M20) CR tablet 40 mEq  Every 4 Hours         04/19/25 1952 04/19/25 1557  sodium chloride 0.9 % flush 10 mL  As Needed        Placed in \"And\" Linked Group    04/19/25 1557    Unscheduled  Oxygen Therapy- Nasal Cannula; Titrate 1-6 LPM Per SpO2; 90 - 95%  Continuous PRN       04/19/25 2139                  Operative/Procedure Notes (last 24 hours)  Notes from 04/19/25 2204 through 04/20/25 2204   No notes of this type exist for this encounter.          Physician Progress Notes (last 24 hours)        Srikanth Ruff MD at 04/20/25 1723              [facility]     LOS: 1 day     Subjective:    CC:Fatigue ( ) and Weakness - Generalized    DIET:  Diet Order   Procedures    Diet: Cardiac; Healthy Heart (2-3 Na+); Fluid Consistency: Thin (IDDSI 0)       Objective:    Vital Signs:  Temp:  [98.1 °F (36.7 °C)-98.7 °F (37.1 °C)] 98.7 °F (37.1 °C)  Heart Rate:  [] 76  Resp:  [16-18] 16  BP: (114-146)/() 114/83    SpO2:  [95 %-100 %] 98 %  on   ;   Device (Oxygen Therapy): room air  Body mass index is 29 kg/m².    Physical Exam  Constitutional:       General: He is not in acute distress.  Cardiovascular:      Rate and Rhythm: Normal rate and regular rhythm.   Pulmonary:      Effort: Pulmonary effort is normal.      Breath sounds: Normal breath sounds.   Abdominal:      General: There is no distension.      Palpations: Abdomen is soft.      Tenderness: There is no abdominal tenderness.   Skin:     General: Skin is warm and dry.   Neurological:      General: No focal deficit present.      Mental Status: He is alert.   Psychiatric:         Mood and Affect: Mood normal.         Behavior: Behavior " "normal.         Results Review:    Glucose   Date Value Ref Range Status   04/20/2025 96 65 - 99 mg/dL Final   04/19/2025 140 (H) 65 - 99 mg/dL Final     Results from last 7 days   Lab Units 04/20/25  0611   WBC 10*3/mm3 5.00   HEMOGLOBIN g/dL 13.0   HEMATOCRIT % 37.8   PLATELETS 10*3/mm3 142     Results from last 7 days   Lab Units 04/20/25  0844 04/20/25  0611   SODIUM mmol/L  --  138   POTASSIUM mmol/L 4.4 4.0   CHLORIDE mmol/L  --  103   CO2 mmol/L  --  25.0   BUN mg/dL  --  14   CREATININE mg/dL  --  1.09   CALCIUM mg/dL  --  8.5*   BILIRUBIN mg/dL  --  0.4   ALK PHOS U/L  --  68   ALT (SGPT) U/L  --  22   AST (SGOT) U/L  --  18   GLUCOSE mg/dL  --  96     Results from last 7 days   Lab Units 04/19/25  1604   INR  1.04   APTT seconds 23.3         Results from last 7 days   Lab Units 04/19/25  1639 04/19/25  1541   HSTROP T ng/L 16 15     Cultures:  No results found for: \"BLOODCX\", \"URINECX\", \"WOUNDCX\", \"MRSACX\", \"RESPCX\", \"STOOLCX\"    I have reviewed daily medications and changes in CPOE    Scheduled meds  atorvastatin, 80 mg, Oral, Daily  losartan, 50 mg, Oral, Q24H  metoprolol tartrate, 50 mg, Oral, BID  pantoprazole, 40 mg, Oral, Q AM  sertraline, 50 mg, Oral, Daily  sodium chloride, 10 mL, Intravenous, Q12H        sodium chloride, 125 mL/hr, Last Rate: 125 mL/hr (04/20/25 0544)      PRN meds    acetaminophen **OR** acetaminophen **OR** acetaminophen    Calcium Replacement - Follow Nurse / BPA Driven Protocol    Magnesium Standard Dose Replacement - Follow Nurse / BPA Driven Protocol    nitroglycerin    ondansetron ODT **OR** ondansetron    Phosphorus Replacement - Follow Nurse / BPA Driven Protocol    Potassium Replacement - Follow Nurse / BPA Driven Protocol    [COMPLETED] Insert Peripheral IV **AND** sodium chloride    sodium chloride    sodium chloride        Generalized weakness    Tachycardia    HTN (hypertension)    Dyslipidemia    Anxiety        Assessment/Plan:  Sinus tachycardia on admission which " resolved with fluids  - Stopped HCTZ  - Likely in part related to dehydration  - Checking carcinoid/serotonin/metanephrine markers    Intermittent flushing spells  - Cardiology does not feel likely cardiac primary  - Checking echocardiogram    Acute kidney injury has resolved    Possible discharge tomorrow and will have to follow-up with lab work outpatient with either cardiology or primary care doctor      Srikanth Ruff MD  25  17:23 EDT        Electronically signed by Srikanth Ruff MD at 25 1725          Consult Notes (last 24 hours)        Rodrigo Zacarias MD at 25 1218        Consult Orders    1. Inpatient Cardiology Consult [[id number]] ordered by Srikanth Ruff MD at 25 1129                       [facility]        Patient Name: Bud Camacho  Age/Sex: 64 y.o. male  : 1960  MRN: [id number]    Date of Admission: 2025  Date of Encounter Visit: 25  Encounter Provider: Rodrigo Zacarias MD  Referring Provider: Rebekah Adams MD  Place of Service: [facility] CARDIOLOGY  Patient Care Team:  Antonietta Romero APRN as PCP - General (Nurse Practitioner)    Subjective:     Chief Complaint: Dizziness and intermittent flushing    Reason for consult: Sinus tachycardia       History of Present Illness:  Bud Camacho is a 64 y.o. male who presents for further evaluation of intermittent sinus tachycardia spells in the setting of flushing, diaphoresis and dizziness.    He has a history of hypertension hyperlipidemia, and over the last 2 weeks or so he is had these worsening spells which he describes as sweating, tremors, and lightheadedness with standing, this was accompanied with some anxiousness as well as tremors.  He has diffuse weakness during the spells.  He recalls the first event occurred during a stressful experience at a bank, restarted became lightheaded, flushed, maybe with some nausea.  No changes to his bowel or bladder  "habits.  He felt weak and had to be escorted to his car.  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  He does not think that he was dehydrated but does not recall drinking a lot of fluids.  That spell was quite profound and because of the prominence of that he came to the ER for further evaluation    He initially had an elevated lactate of 2.5 with a creatinine 1.42 that did respond to IV fluids.  Other testing was unremarkable including a normal sed rate and procalcitonin.    He reports that he has been on Zoloft for 6 months or so.  Over the last few months, he has tried to diet, exercise and lose weight.  He reports he has modified his diet and eats a lot of berries.  He denies any other new medication changes.  He did recently try to lose weight, he reports he \"took a system flush pill\" about 2 weeks ago around the time the symptoms started, but he is not taking it since the symptoms persist.    Prior Cardiac Testing:  None      Past Medical History:  Past Medical History:   Diagnosis Date    Hyperlipidemia     Hypertension        History reviewed. No pertinent surgical history.    Home Medications:   Medications Prior to Admission   Medication Sig Dispense Refill Last Dose/Taking    metoprolol tartrate (LOPRESSOR) 50 MG tablet Take 1 tablet by mouth 2 (Two) Times a Day.   Taking    omeprazole (priLOSEC) 40 MG capsule Take 1 capsule by mouth Daily.   Taking    atorvastatin (LIPITOR) 80 MG tablet Take 1 tablet by mouth Daily.       losartan-hydrochlorothiazide (HYZAAR) 50-12.5 MG per tablet Take  by mouth Daily.       sertraline (ZOLOFT) 50 MG tablet Take 1 tablet by mouth Daily.          Allergies:  Allergies   Allergen Reactions "    Amlodipine Swelling    Lisinopril Other (See Comments)     drowsy    Olmesartan Other (See Comments)     Sexual dysfunction       Past Social History:  Social History     Socioeconomic History    Marital status:    Tobacco Use    Smoking status: Never    Smokeless tobacco: Current     Types: Chew   Vaping Use    Vaping status: Never Used   Substance and Sexual Activity    Alcohol use: Yes     Alcohol/week: 4.0 standard drinks of alcohol     Types: 4 Cans of beer per week     Comment: 5/7 days/wk    Drug use: Never       Past Family History:  History reviewed. No pertinent family history.    REVIEW OF SYSTEMS:   14 point ROS was performed and is negative except as outlined in HPI          Objective:   Temp:  [98.1 °F (36.7 °C)-98.8 °F (37.1 °C)] 98.1 °F (36.7 °C)  Heart Rate:  [] 75  Resp:  [16-18] 16  BP: (117-146)/() 124/86     Intake/Output Summary (Last 24 hours) at 4/20/2025 1313  Last data filed at 4/20/2025 0900  Gross per 24 hour   Intake 1410 ml   Output --   Net 1410 ml     Body mass index is 29 kg/m².      04/19/25  1548 04/19/25  1950 04/20/25  0530   Weight: 88.5 kg (195 lb 1.7 oz) 92 kg (202 lb 14.4 oz) 91.7 kg (202 lb 1.6 oz)     Weight change:     General Appearance:    Alert, cooperative, in no acute distress   Throat:   oral mucosa moist   Neck:   supple, trachea midline, no thyromegaly, no carotid bruit, no JVD   Lungs:     Clear to auscultation,respirations regular, even and unlabored.    Heart:    Regular rhythm and normal rate, normal S1 and S2, no murmur, no gallop, no rub, no click   Chest Wall:    No abnormalities observed   Abdomen:     nondistended, no guarding, no rebound  tenderness   Extremities:   Moves all extremities well, no significant edema, no cyanosis, no redness, small amount of clamminess to hands   Pulses:   Pulses palpable and equal bilaterally. Normal radial, carotid, femoral, dorsalis pedis and posterior tibial pulses bilaterally.   "  Skin:  Psychiatric:   No bleeding, bruising or rash    Alert and oriented x 3, normal mood and affect     Lab Review:   Results from last 7 days   Lab Units 04/20/25  0844 04/20/25  0611 04/19/25  1541   SODIUM mmol/L  --  138 139   POTASSIUM mmol/L 4.4 4.0 3.1*   CHLORIDE mmol/L  --  103 98   CO2 mmol/L  --  25.0 24.5   BUN mg/dL  --  14 15   CREATININE mg/dL  --  1.09 1.42*   GLUCOSE mg/dL  --  96 140*   CALCIUM mg/dL  --  8.5* 10.1   AST (SGOT) U/L  --  18 21   ALT (SGPT) U/L  --  22 28     Results from last 7 days   Lab Units 04/19/25  1639 04/19/25  1541   HSTROP T ng/L 16 15     Results from last 7 days   Lab Units 04/20/25  0611 04/19/25  1541   WBC 10*3/mm3 5.00 9.03   HEMOGLOBIN g/dL 13.0 16.0   HEMATOCRIT % 37.8 45.6   PLATELETS 10*3/mm3 142 187     Results from last 7 days   Lab Units 04/19/25  1604   INR  1.04   APTT seconds 23.3               Invalid input(s): \"LDLCALC\"      Results from last 7 days   Lab Units 04/20/25  0611   TSH uIU/mL 3.860       Echo EF Estimated  No results found for: \"ECHOEFEST\"    EKG:   I personally viewed and interpreted the patient's EKG     Imaging:  Imaging Results (Most Recent)       Procedure Component Value Units Date/Time    CT Head Without Contrast [[id number]] Collected: 04/19/25 1718     Updated: 04/19/25 1723    Narrative:      CT HEAD WO CONTRAST-     INDICATIONS: Weakness     TECHNIQUE: Radiation dose reduction techniques were utilized, including  automated exposure control and exposure modulation based on body size.  Noncontrast head CT     COMPARISON: 10/22/2022     FINDINGS:           No acute intracranial hemorrhage, midline shift or mass effect. No acute  territorial infarct is identified.     Arterial calcifications are seen at the base of the brain.     Ventricles, cisterns, cerebral sulci are unremarkable for patient age.     Small likely mucous retention cyst or polyp in left maxillary sinus. The  visualized paranasal sinuses, orbits, mastoid air cells " are  unremarkable.          Impression:         No acute intracranial hemorrhage or hydrocephalus. If there is further  clinical concern, MRI could be considered for further evaluation.     This report was finalized on 4/19/2025 5:20 PM by Dr. Yoav Parra M.D on Workstation: [employer]       XR Chest 1 View [[id number]] Collected: 04/19/25 1625     Updated: 04/19/25 1631    Narrative:      XR CHEST 1 VW-     HISTORY: Male who is 64 years-old, weakness     TECHNIQUE: Frontal view of the chest     COMPARISON: None available     FINDINGS: The heart size is normal. Aorta is tortuous. Pulmonary  vasculature is unremarkable. No focal pulmonary consolidation, pleural  effusion, or pneumothorax. No acute osseous process.       Impression:      No focal pulmonary consolidation. Tortuous aorta. Follow-up  as clinically indicated.     This report was finalized on 4/19/2025 4:28 PM by Dr. Yoav Parra M.D on Workstation: [id number]                   Assessment:     Active Hospital Problems    Diagnosis  POA    **Generalized weakness [R53.1]  Yes    Tachycardia [R00.0]  Unknown    HTN (hypertension) [I10]  Unknown    Dyslipidemia [E78.5]  Unknown    Anxiety [F41.9]  Unknown      Resolved Hospital Problems   No resolved problems to display.          Plan:     Sinus tachycardia: Suspicion for primary cardiac cause is low, workup per below  Echo is reasonable to rule out structural heart disease and possible torturous aorta noted on chest x-ray  Intermittent lightheadedness with flushing with intermittent dizziness anxiousness and tremulous: ESR/CRP unremarkable.  Etiology remains unclear, possibly could be related to stress and vasovagal episodes that occur sporadically without warning.  Will assess for organic cause and obtain lab testing for carcinoid/serotonin and assess metanephrines for pheo  Reviewed thyroid testing, unremarkable  Other etiology could be just related to vasovagal episodes.  FE: Resolved with IV  "fluids.  This could be related to dehydration secondary to yardwork during the heat yesterday.  Elevated lactate also resolved which could point towards dehydration  Intermittent alcohol use.  He denies any heavy use and states maybe just 1 drink a day to \"take the edge off.\"  I did advise patient that at least until the testing is completed, or until the symptoms calm down is better to avoid alcohol altogether to avoid any confounders    Thank you for allowing me to participate in the care of Bud Camacho.  Arrange for above testing, will follow testing results.  Feel free to contact me directly with any further questions or concerns.    Rodrigo Zacarias MD  [facility]  04/20/25  12:18 EDT      Part of this note may be an electronic transcription/translation of spoken language to printed text using the Dragon Dictation System.      Electronically signed by Rodrigo Zacarias MD at 04/20/25 1526       "

## 2025-04-21 NOTE — DISCHARGE SUMMARY
[facility]    ASSOCIATES  [phone]    DISCHARGE SUMMARY  [facility]    Patient Identification:  Name: Bud Camacho  Age: 64 y.o.  Sex: male  :  1960  MRN: [id number]  Primary Care Physician: Antonietta Romero APRN    Admit date: 2025  Discharge date and time:      Discharge Diagnoses:  Generalized weakness    Tachycardia    HTN (hypertension)    Dyslipidemia    Anxiety         Hospital Course:     Patient was admitted to the hospital because he has been having recurrent spells of severe weakness and dizziness.  He denies any palpitations with these episodes.  They have occurred over the last approximately 3 to 4 weeks.  1 spell occurred while he was at the bank trying to sign a check.  Another spell occurred while at a grandsons birthday party.  This spell that brought him to the hospital occurred.  Completed mowing yard for about 4 hours.  He became very weak somewhat confused.  He was brought to the emergency room in the emergency room he was found to have heart rate of 140.  His lactic acid was elevated and his creatinine was elevated consistent with dehydration.  He was given IV fluids.  And had resolution of his lactic acid and his acute kidney injury.    The patient is on HCTZ at home so this has been stopped.  Also on losartan which she will continue on discharge.  And he is on metoprolol which she will continue.    Patient's potassium was low on admission for which he received replacement.    He underwent an echocardiogram which was unrevealing.  Ejection fraction was normal.  There was mild concentric hypertrophy.  Diastolic function was normal.  Mild mitral regurgitation.    Patient had urine collected for metanephrines and 5IAA.  D-dimer test here in the hospital was normal, TSH free T4 were normal.  Chromogranin a is pending.    Patient was seen by cardiology who did not feel this was likely going to be a primary cardiac related event.  They did  recommend a Holter monitor.  Patient will need to follow-up with them outpatient.      The patient was seen and examined on the day of discharge.    Consults:   Consults       Date and Time Order Name Status Description    4/20/2025 11:29 AM Inpatient Cardiology Consult Completed     4/19/2025  6:18 PM LHILDA (on-call MD unless specified) Details              Results from last 7 days   Lab Units 04/20/25  0611   WBC 10*3/mm3 5.00   HEMOGLOBIN g/dL 13.0   HEMATOCRIT % 37.8   PLATELETS 10*3/mm3 142       Results from last 7 days   Lab Units 04/20/25  0844 04/20/25  0611   SODIUM mmol/L  --  138   POTASSIUM mmol/L 4.4 4.0   CHLORIDE mmol/L  --  103   CO2 mmol/L  --  25.0   BUN mg/dL  --  14   CREATININE mg/dL  --  1.09   GLUCOSE mg/dL  --  96   CALCIUM mg/dL  --  8.5*       Significant Diagnostic Studies:   WBC   Date Value Ref Range Status   04/20/2025 5.00 3.40 - 10.80 10*3/mm3 Final     Hemoglobin   Date Value Ref Range Status   04/20/2025 13.0 13.0 - 17.7 g/dL Final     Hematocrit   Date Value Ref Range Status   04/20/2025 37.8 37.5 - 51.0 % Final     Platelets   Date Value Ref Range Status   04/20/2025 142 140 - 450 10*3/mm3 Final     Sodium   Date Value Ref Range Status   04/20/2025 138 136 - 145 mmol/L Final     Potassium   Date Value Ref Range Status   04/20/2025 4.4 3.5 - 5.2 mmol/L Final     Chloride   Date Value Ref Range Status   04/20/2025 103 98 - 107 mmol/L Final     CO2   Date Value Ref Range Status   04/20/2025 25.0 22.0 - 29.0 mmol/L Final     BUN   Date Value Ref Range Status   04/20/2025 14 8 - 23 mg/dL Final     Creatinine   Date Value Ref Range Status   04/20/2025 1.09 0.76 - 1.27 mg/dL Final     Glucose   Date Value Ref Range Status   04/20/2025 96 65 - 99 mg/dL Final     Calcium   Date Value Ref Range Status   04/20/2025 8.5 (L) 8.6 - 10.5 mg/dL Final     AST (SGOT)   Date Value Ref Range Status   04/20/2025 18 1 - 40 U/L Final     ALT (SGPT)   Date Value Ref Range Status   04/20/2025 22 1 - 41 U/L  "Final     Alkaline Phosphatase   Date Value Ref Range Status   04/20/2025 68 39 - 117 U/L Final     INR   Date Value Ref Range Status   04/19/2025 1.04 0.90 - 1.10 Final     Color, UA   Date Value Ref Range Status   04/19/2025 Yellow Yellow, Straw Final     Appearance, UA   Date Value Ref Range Status   04/19/2025 Clear Clear Final     pH, UA   Date Value Ref Range Status   04/19/2025 7.0 5.0 - 8.0 Final     Glucose, UA   Date Value Ref Range Status   04/19/2025 Negative Negative Final     Ketones, UA   Date Value Ref Range Status   04/19/2025 Negative Negative Final     Blood, UA   Date Value Ref Range Status   04/19/2025 Negative Negative Final     Leuk Esterase, UA   Date Value Ref Range Status   04/19/2025 Negative Negative Final     Bilirubin, UA   Date Value Ref Range Status   04/19/2025 Negative Negative Final     Urobilinogen, UA   Date Value Ref Range Status   04/19/2025 1.0 E.U./dL 0.2 - 1.0 E.U./dL Final     HS Troponin T   Date Value Ref Range Status   04/19/2025 16 <22 ng/L Final   04/19/2025 15 <22 ng/L Final     No components found for: \"HGBA1C;2\"  No components found for: \"TSH;2\"    Imaging Results (All)       Procedure Component Value Units Date/Time    CT Head Without Contrast [[id number]] Collected: 04/19/25 1718     Updated: 04/19/25 1723    Narrative:      CT HEAD WO CONTRAST-     INDICATIONS: Weakness     TECHNIQUE: Radiation dose reduction techniques were utilized, including  automated exposure control and exposure modulation based on body size.  Noncontrast head CT     COMPARISON: 10/22/2022     FINDINGS:           No acute intracranial hemorrhage, midline shift or mass effect. No acute  territorial infarct is identified.     Arterial calcifications are seen at the base of the brain.     Ventricles, cisterns, cerebral sulci are unremarkable for patient age.     Small likely mucous retention cyst or polyp in left maxillary sinus. The  visualized paranasal sinuses, orbits, mastoid air cells " "are  unremarkable.          Impression:         No acute intracranial hemorrhage or hydrocephalus. If there is further  clinical concern, MRI could be considered for further evaluation.     This report was finalized on 4/19/2025 5:20 PM by Dr. Yoav Parra M.D on Workstation: [id number]       XR Chest 1 View [[id number]] Collected: 04/19/25 1625     Updated: 04/19/25 1631    Narrative:      XR CHEST 1 VW-     HISTORY: Male who is 64 years-old, weakness     TECHNIQUE: Frontal view of the chest     COMPARISON: None available     FINDINGS: The heart size is normal. Aorta is tortuous. Pulmonary  vasculature is unremarkable. No focal pulmonary consolidation, pleural  effusion, or pneumothorax. No acute osseous process.       Impression:      No focal pulmonary consolidation. Tortuous aorta. Follow-up  as clinically indicated.     This report was finalized on 4/19/2025 4:28 PM by Dr. Yoav Parra M.D on Workstation: [id number]           No results found for: \"SITE\", \"ALLENTEST\", \"PHART\", \"KRA2XEG\", \"PO2ART\", \"VKX6PQX\", \"BASEEXCESS\", \"W3DDMAOJ\", \"HGBBG\", \"HCTABG\", \"OXYHEMOGLOBI\", \"METHHGBN\", \"CARBOXYHGB\", \"CO2CT\", \"BAROMETRIC\", \"MODALITY\", \"FIO2\"       Discharge Medications        New Medications        Instructions Start Date   losartan 50 MG tablet  Commonly known as: COZAAR  Replaces: losartan-hydrochlorothiazide 50-12.5 MG per tablet   50 mg, Oral, Every 24 Hours Scheduled   Start Date: April 22, 2025            Continue These Medications        Instructions Start Date   atorvastatin 80 MG tablet  Commonly known as: LIPITOR   80 mg, Oral, Daily      metoprolol tartrate 50 MG tablet  Commonly known as: LOPRESSOR   50 mg, 2 Times Daily      omeprazole 40 MG capsule  Commonly known as: priLOSEC   40 mg, Daily      sertraline 50 MG tablet  Commonly known as: ZOLOFT   50 mg, Oral, Daily             Stop These Medications      losartan-hydrochlorothiazide 50-12.5 MG per tablet  Commonly known as: HYZAAR  Replaced by: " losartan 50 MG tablet                Patient Instructions:       No future appointments.         Discharge Order (From admission, onward)      None            Diet Order   Procedures    Diet: Cardiac; Healthy Heart (2-3 Na+); Fluid Consistency: Thin (IDDSI 0)       TEST  RESULTS PENDING AT DISCHARGE  Pending Labs       Order Current Status    Chromogranin A In process              Discharge instructions:  Follow up with your primary care provider in 1-2 weeks with a cbc and cmp         Total time spent discharging patient including evaluation, post hospitalization follow up,  medication and post hospitalization instructions and education, total time exceeds 30 minutes.    Signed:  Srikanth Ruff MD  4/21/2025  11:41 EDT

## 2025-04-21 NOTE — PLAN OF CARE
Goal Outcome Evaluation:   Pt is a 65 yo male who was admitted on 04/19/2025 for generalized weakness.   Pt is A/Ox4, PWD, PMSx4, PERRL, - JVD, trachea midline, = rise and fall of chest wall with respirations, CEBBS, pt free from complaints at the present time.  Pt education done, worked on care plan goals throughout the shift.  Safety rounds done every two hours.  Pt had echo today, zio patch applied.  Received discharge orders.  IV discontinued with tip intact.  Discharge teaching done.  Pt verbalized understanding and agreement with discharge plan.  Pt discharged without incident.

## 2025-04-21 NOTE — PROGRESS NOTES
LOS: 2 days   Patient Care Team:  Antonietta Romero APRN as PCP - General (Nurse Practitioner)    Chief Complaint: Follow-up episodic lightheadedness with associated flushing and diaphoresis, sinus tachycardia.    Interval History: He is feeling better in general.  He was able to ambulate without any difficulty earlier today.  His heart rate is currently good.  No chest pain or shortness of breath this morning.    Vital Signs:  Temp:  [97.7 °F (36.5 °C)-98.9 °F (37.2 °C)] 98.9 °F (37.2 °C)  Heart Rate:  [74-85] 74  Resp:  [16-18] 16  BP: (114-133)/(78-92) 133/84    Intake/Output Summary (Last 24 hours) at 4/21/2025 1210  Last data filed at 4/20/2025 2312  Gross per 24 hour   Intake 690 ml   Output --   Net 690 ml       Physical Exam:   General Appearance:    No acute distress, alert and oriented x4   Lungs:     Clear to auscultation bilaterally     Heart:    Regular rhythm and normal rate.  No murmurs, gallops, or     rubs.   Abdomen:     Soft, nontender, nondistended.    Extremities:   No clubbing, cyanosis, or edema.     Results Review:    Results from last 7 days   Lab Units 04/20/25  0844 04/20/25  0611   SODIUM mmol/L  --  138   POTASSIUM mmol/L 4.4 4.0   CHLORIDE mmol/L  --  103   CO2 mmol/L  --  25.0   BUN mg/dL  --  14   CREATININE mg/dL  --  1.09   GLUCOSE mg/dL  --  96   CALCIUM mg/dL  --  8.5*     Results from last 7 days   Lab Units 04/19/25  1639 04/19/25  1541   HSTROP T ng/L 16 15     Results from last 7 days   Lab Units 04/20/25  0611   WBC 10*3/mm3 5.00   HEMOGLOBIN g/dL 13.0   HEMATOCRIT % 37.8   PLATELETS 10*3/mm3 142     Results from last 7 days   Lab Units 04/19/25  1604   INR  1.04   APTT seconds 23.3                   I reviewed the patient's new clinical results.        Assessment:  1.  Episodic lightheadedness with associated flushing and diaphoresis  2.  Generalized weakness  3.  Acute kidney injury, resolved with IV fluids  4.  Sinus tachycardia  5.  Intermittent alcohol use  6.   Hypertension    Plan:  - I suspect that he had some degree of volume depletion secondary to HCTZ.  His sinus tachycardia and acute kidney injury both resolved with IV fluids.  I am doubtful that this is going to be from a pathological process.  I would continue to hold his HCTZ at discharge.  Continue metoprolol and losartan at current doses.    -Because of the episodic nature, 24-hour urine for 5-HIAA and metanephrine levels are pending.  He should finish this study this afternoon.  TSH was normal.    -Awaiting echocardiogram to assess for any potential structural heart disease.  However, I feel this is less likely.    -If the echocardiogram looks reasonable, and he is feeling better by this afternoon, he can likely be discharged from a cardiac perspective.  He will need a 14-day Zio patch at discharge as well.    Higinio King MD  04/21/25  12:10 EDT

## 2025-04-22 NOTE — CASE MANAGEMENT/SOCIAL WORK
Case Management Discharge Note      Final Note: Home         Selected Continued Care - Discharged on 4/21/2025 Admission date: 4/19/2025 - Discharge disposition: Home or Self Care      Destination    No services have been selected for the patient.                Durable Medical Equipment    No services have been selected for the patient.                Dialysis/Infusion    No services have been selected for the patient.                Home Medical Care    No services have been selected for the patient.                Therapy    No services have been selected for the patient.                Community Resources    No services have been selected for the patient.                Community & DME    No services have been selected for the patient.                    Transportation Services  Private: Car    Final Discharge Disposition Code: 01 - home or self-care

## 2025-04-23 LAB — CGA SERPL-MCNC: 451.2 NG/ML (ref 0–101.8)

## 2025-04-25 LAB
5OH-INDOLEACETATE 24H UR-MCNC: 1.6 MG/L
5OH-INDOLEACETATE 24H UR-MRATE: 4.8 MG/24 HR (ref 0–14.9)

## 2025-04-26 LAB
METANEPH 24H UR-MCNC: 23 UG/L
METANEPH 24H UR-MRATE: 69 UG/24 HR (ref 58–276)
NORMETANEPHRINE 24H UR-MCNC: 81 UG/L
NORMETANEPHRINE 24H UR-MRATE: 243 UG/24 HR (ref 156–729)

## 2025-04-28 ENCOUNTER — OFFICE VISIT (OUTPATIENT)
Dept: CARDIOLOGY | Age: 65
End: 2025-04-28
Payer: MEDICARE

## 2025-04-28 ENCOUNTER — TELEPHONE (OUTPATIENT)
Dept: CARDIOLOGY | Age: 65
End: 2025-04-28

## 2025-04-28 VITALS
WEIGHT: 201.8 LBS | OXYGEN SATURATION: 99 % | DIASTOLIC BLOOD PRESSURE: 80 MMHG | HEIGHT: 70 IN | BODY MASS INDEX: 28.89 KG/M2 | SYSTOLIC BLOOD PRESSURE: 140 MMHG

## 2025-04-28 DIAGNOSIS — I10 PRIMARY HYPERTENSION: ICD-10-CM

## 2025-04-28 DIAGNOSIS — E78.5 DYSLIPIDEMIA: ICD-10-CM

## 2025-04-28 DIAGNOSIS — E86.0 DEHYDRATION: ICD-10-CM

## 2025-04-28 DIAGNOSIS — R55 VASOVAGAL EPISODE: Primary | ICD-10-CM

## 2025-04-28 DIAGNOSIS — R97.8 ELEVATED TUMOR MARKERS: ICD-10-CM

## 2025-04-28 PROCEDURE — 1159F MED LIST DOCD IN RCRD: CPT | Performed by: STUDENT IN AN ORGANIZED HEALTH CARE EDUCATION/TRAINING PROGRAM

## 2025-04-28 PROCEDURE — 3077F SYST BP >= 140 MM HG: CPT | Performed by: STUDENT IN AN ORGANIZED HEALTH CARE EDUCATION/TRAINING PROGRAM

## 2025-04-28 PROCEDURE — 99214 OFFICE O/P EST MOD 30 MIN: CPT | Performed by: STUDENT IN AN ORGANIZED HEALTH CARE EDUCATION/TRAINING PROGRAM

## 2025-04-28 PROCEDURE — 1160F RVW MEDS BY RX/DR IN RCRD: CPT | Performed by: STUDENT IN AN ORGANIZED HEALTH CARE EDUCATION/TRAINING PROGRAM

## 2025-04-28 PROCEDURE — 3079F DIAST BP 80-89 MM HG: CPT | Performed by: STUDENT IN AN ORGANIZED HEALTH CARE EDUCATION/TRAINING PROGRAM

## 2025-04-28 PROCEDURE — G2211 COMPLEX E/M VISIT ADD ON: HCPCS | Performed by: STUDENT IN AN ORGANIZED HEALTH CARE EDUCATION/TRAINING PROGRAM

## 2025-04-28 RX ORDER — TAMSULOSIN HYDROCHLORIDE 0.4 MG/1
0.4 CAPSULE ORAL DAILY
COMMUNITY
Start: 2025-03-24

## 2025-04-28 RX ORDER — LOSARTAN POTASSIUM AND HYDROCHLOROTHIAZIDE 12.5; 1 MG/1; MG/1
1 TABLET ORAL DAILY
COMMUNITY
End: 2025-04-28 | Stop reason: ALTCHOICE

## 2025-04-28 NOTE — PROGRESS NOTES
"      [facility]    Subjective:     Encounter Date:04/28/25      Patient ID: Bud Camacho is a 64 y.o. male.    Chief Complaint:   Chief Complaint   Patient presents with    Follow-up     The patient is in the office today for his follow up appointment.       History of Present Illness    Bud Camacho is a 64 y.o. gentleman who presents for further evaluation.  I saw him during a hospitalization for intermittent presyncopal episodes in the setting of tachycardia.    He had an FE on presentation and sinus tachycardia, and his symptoms were thought to be multifactorial.  He had been under a lot of increased stress, had increased alcohol intake, and was intentionally losing weight with a diet that consisted of a lot of fruits and berries.    He was also on HCTZ for hypertension.  He had a few different episodes that caused him to feel lightheaded and almost passed out.    he describes as sweating, tremors, and lightheadedness with standing, this was accompanied with some anxiousness as well as tremors.  He has diffuse weakness during the spells.  He recalls the first event occurred during a stressful experience at a bank, restarted became lightheaded, flushed, maybe with some nausea.  No changes to his bowel or bladder habits.  He felt weak and had to be escorted to his car.  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  He does not think that he was dehydrated but does not recall drinking a lot of fluids.     He has not had any other spells since his hospitalization.  He afterwards he feels better being off the HCTZ which was discontinued after his hospitalization.     Previous Cardiac Testing:  Echo, April 2025: LVEF " "61 to 65%.  Mild concentric hypertrophy.  Borderline dilated RV but otherwise no significant disease noted.  Holter monitor currently pending.    The following portions of the patient's history were reviewed and updated as appropriate: allergies, current medications, past family history, past medical history, past social history, past surgical history and problem list.    Past Medical History:   Diagnosis Date    Hyperlipidemia     Hypertension        History reviewed. No pertinent surgical history.      Procedures       Objective:     Vitals:    04/28/25 0927   BP: 140/80   BP Location: Left arm   Patient Position: Sitting   Cuff Size: Adult   SpO2: 99%   Weight: 91.5 kg (201 lb 12.8 oz)   Height: 177 cm (69.69\")         Constitutional:       Appearance: Healthy appearance. Not in distress.   Neck:      Vascular: JVD normal.   Pulmonary:      Effort: Pulmonary effort is normal.      Breath sounds: Normal breath sounds.   Cardiovascular:      PMI at left midclavicular line. Normal rate. Regular rhythm. Normal S2.       Murmurs: There is no murmur.   Pulses:     Intact distal pulses.   Edema:     Peripheral edema absent.   Skin:     General: Skin is warm and dry.   Neurological:      General: No focal deficit present.      Mental Status: Alert, oriented to person, place, and time and oriented to person, place and time.   Psychiatric:         Mood and Affect: Mood and affect normal.         Lab Review:       BUN   Date Value Ref Range Status   04/20/2025 14 8 - 23 mg/dL Final     Creatinine   Date Value Ref Range Status   04/20/2025 1.09 0.76 - 1.27 mg/dL Final     Potassium   Date Value Ref Range Status   04/20/2025 4.4 3.5 - 5.2 mmol/L Final     ALT (SGPT)   Date Value Ref Range Status   04/20/2025 22 1 - 41 U/L Final     AST (SGOT)   Date Value Ref Range Status   04/20/2025 18 1 - 40 U/L Final         Performed        Assessment:          Diagnosis Plan   1. Vasovagal episode        2. Dehydration        3. " Elevated tumor markers  Ambulatory Referral to Hematology / Oncology      4. Dyslipidemia        5. Primary hypertension               Plan:         Lightheadedness/presyncope: I suspect related to vasovagal episode secondary to stress.  Also alcohol use was contributing, and possible dehydration related to HCTZ.  HCTZ discontinued.  Symptoms are improved  Secondary workup was negative for pheochromocytoma, negative for serotonin syndrome with normal 5 HIAA, however there was an indeterminately elevated chromogranin A, per below  A Holter monitor is still pending at this time  Elevated chromogranin A: In the setting of normal serotonin/5-HIAA levels, I doubt this would constitute anything clinically significant.  Suspicion for serotonin syndrome here is pretty low, however given the elevated biomarker, referred to oncology placed for an opinion about retesting versus abdominal imaging  Of note, he did have a negative Cologuard recently, I would imagine that if there was a carcinoid tumor it probably would have been picked up on that, but I will defer to the oncology team  Hypertension: Better controlled on losartan metoprolol.  If it becomes a problem still, we consider stopping metoprolol replacing with a vasodilating beta-blocker like Nebivolol or carvedilol.  Continue current course for now.  Intermittent alcohol use.  Improving.    Thank you for allowing me to participate in the care of Bud Camacho. Feel free to contact me directly with any further questions or concerns.    RTC 1 year for reevaluation.    Rodrigo Zacarias MD  [facility]  04/28/25  10:00 EDT       Current Outpatient Medications:     atorvastatin (LIPITOR) 80 MG tablet, Take 1 tablet by mouth Daily., Disp: , Rfl:     losartan (COZAAR) 50 MG tablet, Take 1 tablet by mouth Daily., Disp: 30 tablet, Rfl: 0    metoprolol tartrate (LOPRESSOR) 50 MG tablet, Take 1 tablet by mouth 2 (Two) Times a Day., Disp: , Rfl:     omeprazole  (priLOSEC) 40 MG capsule, Take 1 capsule by mouth Daily., Disp: , Rfl:     sertraline (ZOLOFT) 50 MG tablet, Take 1 tablet by mouth Daily., Disp: , Rfl:     tamsulosin (FLOMAX) 0.4 MG capsule 24 hr capsule, Take 1 capsule by mouth Daily., Disp: , Rfl:          Return in about 1 year (around 4/28/2026).      Part of this note may be an electronic transcription/translation of spoken language to printed text using the Dragon Dictation System.

## 2025-04-28 NOTE — PATIENT INSTRUCTIONS
I think the episodes that you experience was likely something called a vasovagal episode which was likely due to stress, dehydration and use of hydrochlorothiazide.    I do not think that there were other conditions going on, but one of your blood test, called a chromogranin A, came back elevated.  This could be elevated due to a lot of different reasons, and generally, if it was associated with something called a carcinoid tumor, that would have shown up on a Cologuard screening.    However, in very rare circumstances, that can also contribute to intermittent flushing and lightheaded spells.  I doubt this is the case for you, but for completeness would like for you to see a hematology/oncologist, they will call you to get you scheduled.  They may want to repeat the test, or no other testing might be needed, especially with the Cologuard results.  I would recommend you try to obtain the results of your Cologuard screening as the oncology team will likely need to see this.    The heart monitor should result about a week after you mail it in.  Will be in touch.

## 2025-05-13 LAB
CV ZIO BASELINE AVG BPM: 70
CV ZIO BASELINE BPM HIGH: 135
CV ZIO BASELINE BPM LOW: 40

## 2025-05-14 RX ORDER — LOSARTAN POTASSIUM 50 MG/1
50 TABLET ORAL
Qty: 30 TABLET | Refills: 0 | Status: CANCELLED | OUTPATIENT
Start: 2025-05-14

## 2025-05-19 ENCOUNTER — CONSULT (OUTPATIENT)
Dept: ONCOLOGY | Facility: CLINIC | Age: 65
End: 2025-05-19
Payer: MEDICARE

## 2025-05-19 ENCOUNTER — LAB (OUTPATIENT)
Dept: LAB | Facility: HOSPITAL | Age: 65
End: 2025-05-19
Payer: MEDICARE

## 2025-05-19 VITALS
HEART RATE: 59 BPM | BODY MASS INDEX: 28 KG/M2 | DIASTOLIC BLOOD PRESSURE: 87 MMHG | TEMPERATURE: 98.6 F | RESPIRATION RATE: 16 BRPM | OXYGEN SATURATION: 98 % | WEIGHT: 195.6 LBS | HEIGHT: 70 IN | SYSTOLIC BLOOD PRESSURE: 130 MMHG

## 2025-05-19 DIAGNOSIS — R97.8 ELEVATED TUMOR MARKERS: Primary | ICD-10-CM

## 2025-05-19 DIAGNOSIS — R97.8 ABNORMAL TUMOR MARKERS: Primary | ICD-10-CM

## 2025-05-19 LAB
BASOPHILS # BLD AUTO: 0.03 10*3/MM3 (ref 0–0.2)
BASOPHILS NFR BLD AUTO: 0.5 % (ref 0–1.5)
DEPRECATED RDW RBC AUTO: 41.7 FL (ref 37–54)
EOSINOPHIL # BLD AUTO: 0.11 10*3/MM3 (ref 0–0.4)
EOSINOPHIL NFR BLD AUTO: 1.9 % (ref 0.3–6.2)
ERYTHROCYTE [DISTWIDTH] IN BLOOD BY AUTOMATED COUNT: 12.3 % (ref 12.3–15.4)
HCT VFR BLD AUTO: 46.1 % (ref 37.5–51)
HGB BLD-MCNC: 16.2 G/DL (ref 13–17.7)
IMM GRANULOCYTES # BLD AUTO: 0.04 10*3/MM3 (ref 0–0.05)
IMM GRANULOCYTES NFR BLD AUTO: 0.7 % (ref 0–0.5)
LYMPHOCYTES # BLD AUTO: 1.72 10*3/MM3 (ref 0.7–3.1)
LYMPHOCYTES NFR BLD AUTO: 29.1 % (ref 19.6–45.3)
MCH RBC QN AUTO: 32.3 PG (ref 26.6–33)
MCHC RBC AUTO-ENTMCNC: 35.1 G/DL (ref 31.5–35.7)
MCV RBC AUTO: 92 FL (ref 79–97)
MONOCYTES # BLD AUTO: 0.55 10*3/MM3 (ref 0.1–0.9)
MONOCYTES NFR BLD AUTO: 9.3 % (ref 5–12)
NEUTROPHILS NFR BLD AUTO: 3.46 10*3/MM3 (ref 1.7–7)
NEUTROPHILS NFR BLD AUTO: 58.5 % (ref 42.7–76)
NRBC BLD AUTO-RTO: 0 /100 WBC (ref 0–0.2)
PLATELET # BLD AUTO: 149 10*3/MM3 (ref 140–450)
PMV BLD AUTO: 11.2 FL (ref 6–12)
RBC # BLD AUTO: 5.01 10*6/MM3 (ref 4.14–5.8)
WBC NRBC COR # BLD AUTO: 5.91 10*3/MM3 (ref 3.4–10.8)

## 2025-05-19 PROCEDURE — 85025 COMPLETE CBC W/AUTO DIFF WBC: CPT

## 2025-05-19 PROCEDURE — 36415 COLL VENOUS BLD VENIPUNCTURE: CPT

## 2025-05-19 NOTE — PROGRESS NOTES
"Hematology/Oncology Initial Note    Encounter Date: 5/19/2025    Referred by: Rodrigo Zacarias Md  [address]  [facility] [address]  [place] [address]       Hematology History  05/19/2025: Mr. Bud Camacho is a 64 y.o. male who is here for evaluation of elevated chromogranin A levels which was evaluated due to episode of lightheadedness and flushing.  He noticed improvement in symptoms at present and denied any diarrhea.  No weight loss.    Past Medical History  he  has a past medical history of Hyperlipidemia and Hypertension.    Past Surgical History  he  has no past surgical history on file.    Family History  family history is not on file.     Social History   reports that he has never smoked. His smokeless tobacco use includes chew. He reports current alcohol use of about 4.0 standard drinks of alcohol per week. He reports that he does not use drugs.    Review of Systems: 10 point review of systems negative except for ones mentioned in HPI.    Objective   /87   Pulse 59   Temp 98.6 °F (37 °C) (Oral)   Resp 16   Ht 177 cm (69.69\")   Wt 88.7 kg (195 lb 9.6 oz)   SpO2 98%   BMI 28.32 kg/m²   Body surface area is 2.06 meters squared.  Body mass index is 28.32 kg/m².  Physical Exam  Vitals reviewed.   Constitutional:       Appearance: Normal appearance.   HENT:      Head: Normocephalic.      Nose: Nose normal.      Mouth/Throat:      Mouth: Mucous membranes are moist.   Eyes:      General: No scleral icterus.     Conjunctiva/sclera: Conjunctivae normal.   Cardiovascular:      Rate and Rhythm: Normal rate.      Pulses: Normal pulses.   Pulmonary:      Effort: Pulmonary effort is normal.      Breath sounds: Normal breath sounds.   Musculoskeletal:      Cervical back: Normal range of motion.   Skin:     General: Skin is warm.   Neurological:      Mental Status: He is alert. Mental status is at baseline.   Psychiatric:         Mood and Affect: Mood normal.         Behavior: Behavior normal. "           Imaging/Labs  CBC w/diff          4/19/2025    15:41 4/20/2025    06:11 5/19/2025    08:07   CBC w/Diff   WBC 9.03  5.00  5.91    RBC 4.84  3.92  5.01    Hemoglobin 16.0  13.0  16.2    Hematocrit 45.6  37.8  46.1    MCV 94.2  96.4  92.0    MCH 33.1  33.2  32.3    MCHC 35.1  34.4  35.1    RDW 13.0  13.0  12.3    Platelets 187  142  149    Neutrophil Rel % 70.0  45.8  58.5    Immature Granulocyte Rel % 0.2  0.2  0.7    Lymphocyte Rel % 19.9  41.4  29.1    Monocyte Rel % 8.7  9.2  9.3    Eosinophil Rel % 0.8  3.0  1.9    Basophil Rel % 0.4  0.4  0.5      CMP          4/19/2025    15:41 4/20/2025    06:11 4/20/2025    08:44   CMP   Glucose 140  96     BUN 15  14     Creatinine 1.42  1.09     EGFR 54.8  75.3     Sodium 139  138     Potassium 3.1  4.0  4.4    Chloride 98  103     Calcium 10.1  8.5     Total Protein 7.5  5.9     Albumin 4.7  3.8     Globulin 2.8  2.1     Total Bilirubin 0.7  0.4     Alkaline Phosphatase 85  68     AST (SGOT) 21  18     ALT (SGPT) 28  22     Albumin/Globulin Ratio 1.7  1.8     BUN/Creatinine Ratio 10.6  12.8     Anion Gap 16.5  10.0       Estimated Creatinine Clearance: 75.4 mL/min (by C-G formula based on SCr of 1.09 mg/dL).    Assessment & Plan   Abnormal tumor markers     - Referral for elevated chromogranin A with symptoms of diaphoresis and flushing.  No other signs or symptoms of malignancy.  Previous Cologuard test negative.    PLAN  -Chromogranin A is a very nonspecific test and falsely high levels are very frequent with PPI which patient is currently taking as well as from hypertension.  PPI needs to be discontinued for 2 weeks prior to testing. But do not warrant repeat testing now.  The specified symptoms are more suggestive of pheochromocytoma, however, urine normetanephrine levels were normal and with improvement in symptoms with reconciliation of antihypertensive medications, low clinical suspicion for this.  - Continue following up with PCP     I reviewed recent  lab, imaging, and pathology results as available this visit and interpreted independently and discussed with the patient. 35 minutes spent with patient with more than 50% of time on face-to-face counseling.        Aasems Jacob, MD  Hematology/Oncology

## 2025-05-19 NOTE — PROGRESS NOTES
"Hematology/Onc Initial Note    Encounter Date: 5/19/2025    Referred by: Rodrigo Zacarias Md  [address]  [address]  [place] [address]       Hematology History  05/19/2025: Mr. Bud Camacho is a 64 y.o. male who is here for evaluation of ***.    Past Medical History  he  has a past medical history of Hyperlipidemia and Hypertension.    Past Surgical History  he  has no past surgical history on file.    Family History  family history is not on file.     Social History   reports that he has never smoked. His smokeless tobacco use includes chew. He reports current alcohol use of about 4.0 standard drinks of alcohol per week. He reports that he does not use drugs.    Review of Systems: 10 point review of systems negative except for ones mentioned in HPI.    Objective   /87   Pulse 59   Temp 98.6 °F (37 °C) (Oral)   Resp 16   Ht 177 cm (69.69\")   Wt 88.7 kg (195 lb 9.6 oz)   SpO2 98%   BMI 28.32 kg/m²   Body surface area is 2.06 meters squared.  Body mass index is 28.32 kg/m².  Physical Exam  Vitals reviewed.   Constitutional:       Appearance: Normal appearance.   HENT:      Head: Normocephalic.      Nose: Nose normal.      Mouth/Throat:      Mouth: Mucous membranes are moist.   Eyes:      General: No scleral icterus.     Conjunctiva/sclera: Conjunctivae normal.   Cardiovascular:      Rate and Rhythm: Normal rate.      Pulses: Normal pulses.   Pulmonary:      Effort: Pulmonary effort is normal.      Breath sounds: Normal breath sounds.   Musculoskeletal:      Cervical back: Normal range of motion.   Skin:     General: Skin is warm.   Neurological:      Mental Status: He is alert. Mental status is at baseline.   Psychiatric:         Mood and Affect: Mood normal.         Behavior: Behavior normal.         Imaging/Labs  CBC w/diff          4/19/2025    15:41 4/20/2025    06:11 5/19/2025    08:07   CBC w/Diff   WBC 9.03  5.00  5.91    RBC 4.84  3.92  5.01    Hemoglobin 16.0  13.0  16.2    Hematocrit 45.6  " 37.8  46.1    MCV 94.2  96.4  92.0    MCH 33.1  33.2  32.3    MCHC 35.1  34.4  35.1    RDW 13.0  13.0  12.3    Platelets 187  142  149    Neutrophil Rel % 70.0  45.8  58.5    Immature Granulocyte Rel % 0.2  0.2  0.7    Lymphocyte Rel % 19.9  41.4  29.1    Monocyte Rel % 8.7  9.2  9.3    Eosinophil Rel % 0.8  3.0  1.9    Basophil Rel % 0.4  0.4  0.5      CMP          4/19/2025    15:41 4/20/2025    06:11 4/20/2025    08:44   CMP   Glucose 140  96     BUN 15  14     Creatinine 1.42  1.09     EGFR 54.8  75.3     Sodium 139  138     Potassium 3.1  4.0  4.4    Chloride 98  103     Calcium 10.1  8.5     Total Protein 7.5  5.9     Albumin 4.7  3.8     Globulin 2.8  2.1     Total Bilirubin 0.7  0.4     Alkaline Phosphatase 85  68     AST (SGOT) 21  18     ALT (SGPT) 28  22     Albumin/Globulin Ratio 1.7  1.8     BUN/Creatinine Ratio 10.6  12.8     Anion Gap 16.5  10.0       Estimated Creatinine Clearance: 75.4 mL/min (by C-G formula based on SCr of 1.09 mg/dL).    Assessment & Plan   No diagnosis found.     - ***    PLAN  ***    I reviewed recent lab, imaging, and pathology results as available this visit and interpreted independently and discussed with the patient. *** minutes spent with patient with more than 50% of time on face-to-face counseling.        Aasems Jacob, MD  Hematology/Oncology